# Patient Record
Sex: FEMALE | Race: WHITE | NOT HISPANIC OR LATINO | Employment: FULL TIME | URBAN - NONMETROPOLITAN AREA
[De-identification: names, ages, dates, MRNs, and addresses within clinical notes are randomized per-mention and may not be internally consistent; named-entity substitution may affect disease eponyms.]

---

## 2023-04-17 ENCOUNTER — OFFICE VISIT (OUTPATIENT)
Dept: PRIMARY CARE | Facility: CLINIC | Age: 58
End: 2023-04-17
Payer: COMMERCIAL

## 2023-04-17 VITALS
HEART RATE: 68 BPM | WEIGHT: 217 LBS | BODY MASS INDEX: 34.06 KG/M2 | TEMPERATURE: 98.9 F | OXYGEN SATURATION: 96 % | DIASTOLIC BLOOD PRESSURE: 80 MMHG | SYSTOLIC BLOOD PRESSURE: 133 MMHG | HEIGHT: 67 IN | RESPIRATION RATE: 12 BRPM

## 2023-04-17 DIAGNOSIS — G89.29 BILATERAL CHRONIC KNEE PAIN: Primary | ICD-10-CM

## 2023-04-17 DIAGNOSIS — M25.551 BILATERAL HIP PAIN: ICD-10-CM

## 2023-04-17 DIAGNOSIS — M25.561 BILATERAL CHRONIC KNEE PAIN: Primary | ICD-10-CM

## 2023-04-17 DIAGNOSIS — M25.552 BILATERAL HIP PAIN: ICD-10-CM

## 2023-04-17 DIAGNOSIS — M25.562 BILATERAL CHRONIC KNEE PAIN: Primary | ICD-10-CM

## 2023-04-17 PROBLEM — I35.1 AORTIC VALVE INSUFFICIENCY, ACQUIRED: Status: ACTIVE | Noted: 2023-04-17

## 2023-04-17 PROBLEM — J45.909 REACTIVE AIRWAY DISEASE (HHS-HCC): Status: ACTIVE | Noted: 2023-04-17

## 2023-04-17 PROBLEM — F41.8 SITUATIONAL ANXIETY: Status: ACTIVE | Noted: 2023-04-17

## 2023-04-17 PROBLEM — D12.6 SERRATED ADENOMA OF COLON: Status: ACTIVE | Noted: 2023-04-17

## 2023-04-17 PROBLEM — E55.9 VITAMIN D DEFICIENCY: Status: ACTIVE | Noted: 2023-04-17

## 2023-04-17 PROBLEM — M99.09 SEGMENTAL AND SOMATIC DYSFUNCTION: Status: ACTIVE | Noted: 2023-04-17

## 2023-04-17 PROBLEM — I10 HYPERTENSION: Status: ACTIVE | Noted: 2023-04-17

## 2023-04-17 PROBLEM — K21.9 GERD (GASTROESOPHAGEAL REFLUX DISEASE): Status: ACTIVE | Noted: 2023-04-17

## 2023-04-17 PROCEDURE — 99214 OFFICE O/P EST MOD 30 MIN: CPT | Performed by: FAMILY MEDICINE

## 2023-04-17 PROCEDURE — 3075F SYST BP GE 130 - 139MM HG: CPT | Performed by: FAMILY MEDICINE

## 2023-04-17 PROCEDURE — 3079F DIAST BP 80-89 MM HG: CPT | Performed by: FAMILY MEDICINE

## 2023-04-17 RX ORDER — CHOLECALCIFEROL (VITAMIN D3) 50 MCG
2000 TABLET ORAL DAILY
COMMUNITY

## 2023-04-17 RX ORDER — ESCITALOPRAM OXALATE 10 MG/1
10 TABLET ORAL DAILY
COMMUNITY
Start: 2017-03-30 | End: 2023-11-13 | Stop reason: DRUGHIGH

## 2023-04-17 RX ORDER — ALBUTEROL SULFATE 90 UG/1
2 AEROSOL, METERED RESPIRATORY (INHALATION) EVERY 4 HOURS PRN
COMMUNITY
Start: 2020-04-13 | End: 2024-03-22 | Stop reason: SDUPTHER

## 2023-04-17 RX ORDER — MELOXICAM 15 MG/1
15 TABLET ORAL DAILY
Qty: 30 TABLET | Refills: 0 | Status: SHIPPED | OUTPATIENT
Start: 2023-04-17 | End: 2023-10-19 | Stop reason: ALTCHOICE

## 2023-04-17 RX ORDER — HYDROCHLOROTHIAZIDE 12.5 MG/1
1 TABLET ORAL DAILY
COMMUNITY
Start: 2021-09-07 | End: 2023-12-26

## 2023-04-17 RX ORDER — PREDNISONE 20 MG/1
40 TABLET ORAL DAILY
Qty: 10 TABLET | Refills: 0 | Status: SHIPPED | OUTPATIENT
Start: 2023-04-17 | End: 2023-04-22

## 2023-04-17 NOTE — PROGRESS NOTES
Subjective   Patient ID: Susu Martin is a 58 y.o. female who presents for Knee Pain (Pain started getting worse in the last couple of months ) and Hip Pain (Pain is only at night ).    Has had knee pain in the past and has mild medial compartment arthritis on xray of 5/2022.     Both knees - usually one is worse that the other, but not always the same knee is the worst day to day. Pain is all around the knees. Worse than it was last year.     Hips are worse at night, not as soon as laying down, side sleeper and both hips will hurt. Hips started a while ago. A pillow between the legs can help, but still has pain. No prior hip xrays. Pain is primarily in the back, upper buttock muscles, a few inches below the SI joints    No prior injuries or surgeries.  No exercise routine currently.   Concerned about circulation, her father had vascular issues. No color change in the legs.    Legs feel heavy when walking the dog. No upper body pain.   No pain other than the knees.   Sit all day at work.     Uses voltaren topical occasionally for pain, helps some with pain.     HPI    Review of Systems    Objective   Physical Exam    Assessment/Plan   Problem List Items Addressed This Visit    None  Visit Diagnoses       Bilateral chronic knee pain    -  Primary    Relevant Medications    predniSONE (Deltasone) 20 mg tablet    meloxicam (Mobic) 15 mg tablet    Other Relevant Orders    Referral to Physical Therapy    Bilateral hip pain        Relevant Medications    predniSONE (Deltasone) 20 mg tablet    meloxicam (Mobic) 15 mg tablet    Other Relevant Orders    Referral to Physical Therapy

## 2023-04-17 NOTE — PATIENT INSTRUCTIONS
Take prednisone once daily with food, in the morning.    Once prednisone is completed, take mobic once daily for one month.    Start PT and do home exercises as instructed.

## 2023-05-15 ENCOUNTER — TELEPHONE (OUTPATIENT)
Dept: PRIMARY CARE | Facility: CLINIC | Age: 58
End: 2023-05-15

## 2023-05-15 DIAGNOSIS — G89.29 BILATERAL CHRONIC KNEE PAIN: Primary | ICD-10-CM

## 2023-05-15 DIAGNOSIS — M25.561 BILATERAL CHRONIC KNEE PAIN: Primary | ICD-10-CM

## 2023-05-15 DIAGNOSIS — M25.562 BILATERAL CHRONIC KNEE PAIN: Primary | ICD-10-CM

## 2023-05-15 NOTE — TELEPHONE ENCOUNTER
The patient was seen 4/17 with Dr. Moscoso. She states the left knee and buttock are better but her right knee is not. She is asking if she may proceed with ultrasound or MRI for the right knee.  The patient can be reached on her cell phone.

## 2023-05-16 NOTE — TELEPHONE ENCOUNTER
Called and spoke with patient. Patient was informed, understanding verbalized. Sent to Mitzy for assistance with scheduling

## 2023-05-16 NOTE — TELEPHONE ENCOUNTER
S/w pt and informed about injection and ortho and she would like to proceed with ortho referral for the knee injection

## 2023-05-16 NOTE — TELEPHONE ENCOUNTER
Based on the history an MRI is unlikely to help us. At this point she would likely benefit from a joint injection in the right knee and for further evaluation would recommend a referral to ortho for the injection and discussion of next steps. I can place referral order if she would like to take this step.    (5) normal, left/(5) normal, right

## 2023-09-07 ENCOUNTER — OFFICE VISIT (OUTPATIENT)
Dept: PRIMARY CARE | Facility: CLINIC | Age: 58
End: 2023-09-07
Payer: COMMERCIAL

## 2023-09-07 VITALS
SYSTOLIC BLOOD PRESSURE: 128 MMHG | TEMPERATURE: 98 F | WEIGHT: 217 LBS | OXYGEN SATURATION: 96 % | BODY MASS INDEX: 34.5 KG/M2 | RESPIRATION RATE: 16 BRPM | HEART RATE: 83 BPM | DIASTOLIC BLOOD PRESSURE: 81 MMHG

## 2023-09-07 DIAGNOSIS — L08.9 INFECTED CYST OF SKIN: Primary | ICD-10-CM

## 2023-09-07 DIAGNOSIS — L72.9 INFECTED CYST OF SKIN: Primary | ICD-10-CM

## 2023-09-07 DIAGNOSIS — L98.9 PAINFUL SKIN LESION: ICD-10-CM

## 2023-09-07 PROCEDURE — 3074F SYST BP LT 130 MM HG: CPT | Performed by: FAMILY MEDICINE

## 2023-09-07 PROCEDURE — 3079F DIAST BP 80-89 MM HG: CPT | Performed by: FAMILY MEDICINE

## 2023-09-07 PROCEDURE — 99213 OFFICE O/P EST LOW 20 MIN: CPT | Performed by: FAMILY MEDICINE

## 2023-09-07 RX ORDER — HYDROCODONE BITARTRATE AND ACETAMINOPHEN 5; 325 MG/1; MG/1
1 TABLET ORAL EVERY 8 HOURS PRN
Qty: 9 TABLET | Refills: 0 | Status: SHIPPED | OUTPATIENT
Start: 2023-09-07 | End: 2023-09-10

## 2023-09-07 RX ORDER — SULFAMETHOXAZOLE AND TRIMETHOPRIM 800; 160 MG/1; MG/1
1 TABLET ORAL 2 TIMES DAILY
Qty: 14 TABLET | Refills: 0 | Status: SHIPPED | OUTPATIENT
Start: 2023-09-07 | End: 2023-09-13 | Stop reason: SDUPTHER

## 2023-09-07 ASSESSMENT — PAIN SCALES - GENERAL: PAINLEVEL: 7

## 2023-09-07 NOTE — PROGRESS NOTES
Subjective   Patient ID: Susu Martin is a 58 y.o. female who presents for infected cyst .    [facility]   Has an appointment with  on 9/19 for removal. Had a cyst similar to this in a different location and had it removed.   She complains of pain, but no fever.  Has had a cyst x 1 year, reports infection since yesterday-redness, pain and swelling of cyst  Per chart, had epidermal cyst removed in past  Denies drainage at this time  Patient requests vicodin today, did not tolerate percocet in the past for pain    Review of Systems  See HPI    Objective   /81 (BP Location: Right arm, Patient Position: Sitting, BP Cuff Size: Large adult)   Pulse 83   Temp 36.7 °C (98 °F) (Temporal)   Resp 16   Wt 98.4 kg (217 lb)   SpO2 96%   BMI 34.50 kg/m²     Physical Exam  Constitutional: Well developed, well nourished, alert and in no acute distress   Eyes: Normal external exam.   Cardiovascular: No peripheral edema.  Pulmonary: No respiratory distress  Skin: Warm, well perfused, normal skin turgor and color. 3 x 1 cm in diameter erythematous, non-draining fluctuant papule with significant TTP located on left upper anterior chest.  Neurologic: Cranial nerves II-XII grossly intact.   Psychiatric: Mood calm and affect normal.    Assessment/Plan   May take Tylenol 1000mg with Ibuprofen 800mg together every 8 hours as needed for pain.     I personally have reviewed the OARRS report for this patient.  This report is scanned into the electronic medical record.  I have considered the risks of abuse, dependence, addiction and diversion.  I believe that it is clinically appropriate for the patient to be prescribed this medication.    May take Vicodin, as requested, for uncontrolled pain only as needed. Do not drive after taking this medication as it may cause drowsiness. It may also cause constipation.     START Bactrim antibiotic with food as directed. This medication can place you at risk for stomach upset, rash and  C.diff. We discussed benefits, risks and potential adverse effects.     Keep appointment with  on 9/19    If your symptoms worsen, please contact me immediately as we discussed we might need to incise and drain the cyst in the office.

## 2023-09-13 ENCOUNTER — TELEPHONE (OUTPATIENT)
Dept: PRIMARY CARE | Facility: CLINIC | Age: 58
End: 2023-09-13
Payer: COMMERCIAL

## 2023-09-13 DIAGNOSIS — L72.9 INFECTED CYST OF SKIN: ICD-10-CM

## 2023-09-13 DIAGNOSIS — L08.9 INFECTED CYST OF SKIN: ICD-10-CM

## 2023-09-13 RX ORDER — SULFAMETHOXAZOLE AND TRIMETHOPRIM 800; 160 MG/1; MG/1
1 TABLET ORAL 2 TIMES DAILY
Qty: 14 TABLET | Refills: 0 | Status: SHIPPED | OUTPATIENT
Start: 2023-09-13 | End: 2023-09-20

## 2023-09-13 NOTE — TELEPHONE ENCOUNTER
Pt called to see if she could get a refill on her Bactrim. Pt was seen on 9/07/23 for a cyst. Pt states it's still infected. She was wondering if you could just refill or if you need to see her. She does have an apt with Dr. Branch on 9/19/23 for removal. Pt uses [facility] in [facility]. Pt is requesting a cb at [phone].

## 2023-09-29 PROBLEM — L08.9 INFECTED CYST OF SKIN: Status: ACTIVE | Noted: 2023-09-29

## 2023-09-29 PROBLEM — L72.9 INFECTED CYST OF SKIN: Status: ACTIVE | Noted: 2023-09-29

## 2023-09-29 PROBLEM — E66.9 CLASS 1 OBESITY WITH BODY MASS INDEX (BMI) OF 32.0 TO 32.9 IN ADULT: Status: ACTIVE | Noted: 2020-08-31

## 2023-09-29 PROBLEM — R07.9 CHEST PAIN: Status: ACTIVE | Noted: 2023-09-29

## 2023-09-29 PROBLEM — M94.0 COSTOCHONDRITIS: Status: ACTIVE | Noted: 2023-09-29

## 2023-09-29 PROBLEM — R31.21 ASYMPTOMATIC MICROSCOPIC HEMATURIA: Status: ACTIVE | Noted: 2023-09-29

## 2023-09-29 PROBLEM — F43.9 STRESS: Status: ACTIVE | Noted: 2023-09-29

## 2023-09-29 PROBLEM — E66.811 CLASS 1 OBESITY WITH BODY MASS INDEX (BMI) OF 32.0 TO 32.9 IN ADULT: Status: ACTIVE | Noted: 2020-08-31

## 2023-09-29 PROBLEM — M79.18 LEVATOR SCAPULA SYNDROME: Status: ACTIVE | Noted: 2023-09-29

## 2023-09-29 RX ORDER — CYCLOBENZAPRINE HCL 10 MG
1 TABLET ORAL NIGHTLY PRN
COMMUNITY
Start: 2023-01-03 | End: 2023-10-19 | Stop reason: WASHOUT

## 2023-09-29 RX ORDER — MUPIROCIN CALCIUM 20 MG/G
CREAM TOPICAL 3 TIMES DAILY
COMMUNITY
Start: 2023-09-19 | End: 2023-11-13 | Stop reason: ALTCHOICE

## 2023-09-29 RX ORDER — HYDROCODONE BITARTRATE AND ACETAMINOPHEN 5; 325 MG/1; MG/1
1 TABLET ORAL EVERY 4 HOURS PRN
COMMUNITY
Start: 2023-09-19 | End: 2023-10-03 | Stop reason: SDUPTHER

## 2023-10-03 ENCOUNTER — OFFICE VISIT (OUTPATIENT)
Dept: SURGERY | Facility: CLINIC | Age: 58
End: 2023-10-03
Payer: COMMERCIAL

## 2023-10-03 ENCOUNTER — PREP FOR PROCEDURE (OUTPATIENT)
Dept: SURGERY | Facility: HOSPITAL | Age: 58
End: 2023-10-03

## 2023-10-03 VITALS — BODY MASS INDEX: 34.02 KG/M2 | SYSTOLIC BLOOD PRESSURE: 142 MMHG | WEIGHT: 214 LBS | DIASTOLIC BLOOD PRESSURE: 82 MMHG

## 2023-10-03 DIAGNOSIS — L72.0 EPIDERMAL INCLUSION CYST: Primary | ICD-10-CM

## 2023-10-03 PROCEDURE — 99213 OFFICE O/P EST LOW 20 MIN: CPT | Performed by: SURGERY

## 2023-10-03 PROCEDURE — 3079F DIAST BP 80-89 MM HG: CPT | Performed by: SURGERY

## 2023-10-03 PROCEDURE — 3077F SYST BP >= 140 MM HG: CPT | Performed by: SURGERY

## 2023-10-03 RX ORDER — DIAZEPAM 10 MG/1
10 TABLET ORAL ONCE
Status: DISCONTINUED | OUTPATIENT
Start: 2023-10-03 | End: 2023-11-13 | Stop reason: ALTCHOICE

## 2023-10-03 RX ORDER — MUPIROCIN 20 MG/G
1 OINTMENT TOPICAL
COMMUNITY
Start: 2023-09-19 | End: 2023-11-13 | Stop reason: ALTCHOICE

## 2023-10-03 RX ORDER — HYDROCODONE BITARTRATE AND ACETAMINOPHEN 5; 325 MG/1; MG/1
1 TABLET ORAL EVERY 4 HOURS PRN
Qty: 12 TABLET | Refills: 0 | Status: SHIPPED | OUTPATIENT
Start: 2023-10-03 | End: 2023-11-13 | Stop reason: ALTCHOICE

## 2023-10-03 NOTE — H&P (VIEW-ONLY)
History Of Present Illness  Susu Martin is a 58 y.o. female presenting for short-term follow-up.  The patient was seen by me on 9/19/2023 for an infected epidermal inclusion cyst of the left anterior shoulder.  Please see the [employer] note dated 9/19/2023 for details.  At that time, the patient was instructed to finish her current antibiotics, and apply Bactroban 2% 3 times daily along with warm compresses.  I did prescribe hydrocodone for breakthrough pain.  On examination at that time, the patient had a 2-1/2 x 1/2 cm area of area of erythema and induration with a central 2 x 1 cm ulceration with a central sinus tract.  Cystic contents could be expressed.    The patient notes that with antibiotics both oral and topical, and the warm compresses, the inflammation has significantly diminished and nearly completely resolved.  She has had no further drainage.  She denies fever chills or sweats.    Of note, the patient has had 2 previous excision of epidermal inclusion cyst, one of the left posterior neck on 6/23/2017 and one of the anterior chest on 11/15/2019.    The patient lives in [place].  She is single.  She works in property management.     Past Medical History  She has a past medical history of Abdominal distension (gaseous) (05/18/2022), Acute bronchitis, unspecified (11/07/2013), Anxiety disorder, unspecified (04/11/2014), Cervicalgia (11/07/2013), Cough, unspecified (03/17/2014), Cough, unspecified (04/07/2017), Dysuria (03/06/2017), Encounter for general adult medical examination without abnormal findings (11/07/2013), Nicotine dependence, unspecified, uncomplicated (06/02/2014), Other chest pain (04/07/2017), Other conditions influencing health status (11/07/2013), Other specified soft tissue disorders (11/07/2013), Pain in left foot (03/06/2017), Pain in left forearm (03/06/2017), Pain in right knee (03/06/2017), Personal history of other drug therapy (03/06/2017), Stress fracture, unspecified  foot, initial encounter for fracture (03/06/2017), and Unspecified fracture of unspecified foot, initial encounter for closed fracture (11/07/2013).    Surgical History  She has no past surgical history on file.     Social History  She reports that she has been smoking cigarettes. She has been exposed to tobacco smoke. She has never used smokeless tobacco. She reports current alcohol use of about 4.0 standard drinks of alcohol per week. She reports that she does not use drugs.    Family History  Family History   Problem Relation Name Age of Onset    Hypertension Mother      Other (localized cancer of appendix) Mother      Hypertension Father      Lung cancer Sister      Breast cancer Sister          Allergies  Patient has no known allergies.    Review of Systems  Review of Systems    General: Not Present- Obesity, Cancer, HIV, MRSA, Recent Cold/Flu, Tired During the Day and VRE.  HEENT: Not Present- Migraine, Cataracts, Glaucoma, Macular Degeneration and Retinal Detachment.  Respiratory: Not Present- Asthma, Chronic Cough, Difficulty Breathing on Exertion, Difficulty Breathing at Rest, Emphysema, Frequent Bronchitis, Home CPAP/BiPAP, Home Oxygen, Pulmonary Embolus, Pneumonia/TB, Sleep Apnea and Snoring.  Cardiovascular: Not Present- Chest Pain, Congestive Heart Failure, Heart Attack, Coronary Artery Disease, Heart Stent, High Cholesterol/Lipids, Hypertension, Internal Defibrillator, Irregular Heart Beat, Mitral Valve Prolapse, Murmur, Pacemaker and Peripheral Vascular Disease.  Gastrointestinal: Not Present- Heartburn, Hepatitis, Hiatal Hernia, Jaundice, Reflux, Stomach Ulcer and IBS.  Female Genitourinary: Not Present- Kidney Failure, Kidney Stones, Dialysis and Urinary Tract Infection.  Musculoskeletal: Not Present- Arthritis, Back Pain and Fibromyalgia.  Neurological: Not Present- Headaches, Numbness, Tingling, Seizures, Stroke,  Shunt and Weakness.  Psychiatric: Not Present- Anxiety, Bipolar, Depression and  Panic Attacks.  Endocrine: Not Present- Diabetes, Hyperthyroidism, Hypothyroidism and Low Blood Sugar.  Hematology: Not Present- Abnormal Bleeding, Anemia and Blood Clots.    Physical Exam     Skin & Soft Tissue Exam    Integumentary  Global Assessment: Examination of related systems reveals - Well-developed, well-nourished and in no acute distress; alert and oriented x 3,  Neck supple, with no palpable masses, no thyromegaly, No lymphadenopathy and Apocrine and  eccrine glands are normal with no hyperhidrosis.   Upon inspection and palpation of skin surfaces of the - Head/Face: no rashes, ulcers, lesions or evidence of photo damage. No palpable nodules or masses, Neck: no visible lesions or palpable masses, Chest: no swelling,scarring or lesions. No prominent arteries or veins observed, Axillae: non-tender, no inflammation or ulceration, no drainage., Abdomen: no scars, rashes or lesions, Back: normal skin surface, no evidence of  photo damage, no suspicious lesions, Right upper extremity: no lesions or rashes. No evidence of photo damage, Left upper extremity: no lesions or rashes. No evidence of photo damage, Right lower extremity: no stasis ulcers, rashes or suspicious lesions. No evidence of photo damage, Left lower extremity: no stasis ulcers, rashes or suspicious lesions. No evidence of  photo damage, Distribution of scalp and body hair is normal and No dandruff or other scalp lesions noted.    Left anterior shoulder: There is a residual 2 cm sagittal by 1.5 cm transverse area of erythema with a very tiny central scab; no tenderness; there is a palpable cyst along the superior aspect of this area of erythema; significantly improved    Chest and Lung Exam  Chest and lung exam reveals - normal excursion with symmetric chest walls and on auscultation, normal breath sounds, no  adventitious sounds and normal vocal resonance.    Cardiovascular  Cardiovascular examination reveals - normal heart sounds, regular rate  and rhythm with no murmurs.  Last Recorded Vitals  Blood pressure 142/82, weight 97.1 kg (214 lb).    Relevant Results         Assessment/Plan      Susu as in the epidermal inclusion cyst of the left anterior shoulder.  This was infected.  The infection has nearly completely resolved.    Recommendation:    In order to relieve symptoms and further complications, excision of this lesion is indicated and recommended.    We will proceed with this in the near future at [facility] under local anesthesia as an outpatient on Friday, 10/20/2023.    1 hour prior to the procedure, the patient will take a 10 mg tablet of Valium for anxiety.  She will have a .  She will also take 2 extra strength Tylenol at that time.  I have prescribed Norco/hydrocodone for postoperative pain in addition to Tylenol and ibuprofen.    Patient will see me for a postoperative visit at my [facility] office on Tuesday, 10/31/2023.    Skin and Soft Tissue Consent: The procedure was explained to the patient in detail, including the risks, benefits and alternatives. The risks include, but not limited to, infection, bleeding, hematoma, seroma, nerve injury resulting in motor or sensory deficit/disability, recurrence, prolonged wound healing and the need for further surgery. The patient agreed and signed consent.      Mingo Branch MD

## 2023-10-03 NOTE — H&P
History Of Present Illness  Susu Martin is a 58 y.o. female presenting for short-term follow-up.  The patient was seen by me on 9/19/2023 for an infected epidermal inclusion cyst of the left anterior shoulder.  Please see the [employer] note dated 9/19/2023 for details.  At that time, the patient was instructed to finish her current antibiotics, and apply Bactroban 2% 3 times daily along with warm compresses.  I did prescribe hydrocodone for breakthrough pain.  On examination at that time, the patient had a 2-1/2 x 1/2 cm area of area of erythema and induration with a central 2 x 1 cm ulceration with a central sinus tract.  Cystic contents could be expressed.    The patient notes that with antibiotics both oral and topical, and the warm compresses, the inflammation has significantly diminished and nearly completely resolved.  She has had no further drainage.  She denies fever chills or sweats.    Of note, the patient has had 2 previous excision of epidermal inclusion cyst, one of the left posterior neck on 6/23/2017 and one of the anterior chest on 11/15/2019.    The patient lives in [place].  She is single.  She works in property management.     Past Medical History  She has a past medical history of Abdominal distension (gaseous) (05/18/2022), Acute bronchitis, unspecified (11/07/2013), Anxiety disorder, unspecified (04/11/2014), Cervicalgia (11/07/2013), Cough, unspecified (03/17/2014), Cough, unspecified (04/07/2017), Dysuria (03/06/2017), Encounter for general adult medical examination without abnormal findings (11/07/2013), Nicotine dependence, unspecified, uncomplicated (06/02/2014), Other chest pain (04/07/2017), Other conditions influencing health status (11/07/2013), Other specified soft tissue disorders (11/07/2013), Pain in left foot (03/06/2017), Pain in left forearm (03/06/2017), Pain in right knee (03/06/2017), Personal history of other drug therapy (03/06/2017), Stress fracture, unspecified  foot, initial encounter for fracture (03/06/2017), and Unspecified fracture of unspecified foot, initial encounter for closed fracture (11/07/2013).    Surgical History  She has no past surgical history on file.     Social History  She reports that she has been smoking cigarettes. She has been exposed to tobacco smoke. She has never used smokeless tobacco. She reports current alcohol use of about 4.0 standard drinks of alcohol per week. She reports that she does not use drugs.    Family History  Family History   Problem Relation Name Age of Onset    Hypertension Mother      Other (localized cancer of appendix) Mother      Hypertension Father      Lung cancer Sister      Breast cancer Sister          Allergies  Patient has no known allergies.    Review of Systems  Review of Systems    General: Not Present- Obesity, Cancer, HIV, MRSA, Recent Cold/Flu, Tired During the Day and VRE.  HEENT: Not Present- Migraine, Cataracts, Glaucoma, Macular Degeneration and Retinal Detachment.  Respiratory: Not Present- Asthma, Chronic Cough, Difficulty Breathing on Exertion, Difficulty Breathing at Rest, Emphysema, Frequent Bronchitis, Home CPAP/BiPAP, Home Oxygen, Pulmonary Embolus, Pneumonia/TB, Sleep Apnea and Snoring.  Cardiovascular: Not Present- Chest Pain, Congestive Heart Failure, Heart Attack, Coronary Artery Disease, Heart Stent, High Cholesterol/Lipids, Hypertension, Internal Defibrillator, Irregular Heart Beat, Mitral Valve Prolapse, Murmur, Pacemaker and Peripheral Vascular Disease.  Gastrointestinal: Not Present- Heartburn, Hepatitis, Hiatal Hernia, Jaundice, Reflux, Stomach Ulcer and IBS.  Female Genitourinary: Not Present- Kidney Failure, Kidney Stones, Dialysis and Urinary Tract Infection.  Musculoskeletal: Not Present- Arthritis, Back Pain and Fibromyalgia.  Neurological: Not Present- Headaches, Numbness, Tingling, Seizures, Stroke,  Shunt and Weakness.  Psychiatric: Not Present- Anxiety, Bipolar, Depression and  Panic Attacks.  Endocrine: Not Present- Diabetes, Hyperthyroidism, Hypothyroidism and Low Blood Sugar.  Hematology: Not Present- Abnormal Bleeding, Anemia and Blood Clots.    Physical Exam     Skin & Soft Tissue Exam    Integumentary  Global Assessment: Examination of related systems reveals - Well-developed, well-nourished and in no acute distress; alert and oriented x 3,  Neck supple, with no palpable masses, no thyromegaly, No lymphadenopathy and Apocrine and  eccrine glands are normal with no hyperhidrosis.   Upon inspection and palpation of skin surfaces of the - Head/Face: no rashes, ulcers, lesions or evidence of photo damage. No palpable nodules or masses, Neck: no visible lesions or palpable masses, Chest: no swelling,scarring or lesions. No prominent arteries or veins observed, Axillae: non-tender, no inflammation or ulceration, no drainage., Abdomen: no scars, rashes or lesions, Back: normal skin surface, no evidence of  photo damage, no suspicious lesions, Right upper extremity: no lesions or rashes. No evidence of photo damage, Left upper extremity: no lesions or rashes. No evidence of photo damage, Right lower extremity: no stasis ulcers, rashes or suspicious lesions. No evidence of photo damage, Left lower extremity: no stasis ulcers, rashes or suspicious lesions. No evidence of  photo damage, Distribution of scalp and body hair is normal and No dandruff or other scalp lesions noted.    Left anterior shoulder: There is a residual 2 cm sagittal by 1.5 cm transverse area of erythema with a very tiny central scab; no tenderness; there is a palpable cyst along the superior aspect of this area of erythema; significantly improved    Chest and Lung Exam  Chest and lung exam reveals - normal excursion with symmetric chest walls and on auscultation, normal breath sounds, no  adventitious sounds and normal vocal resonance.    Cardiovascular  Cardiovascular examination reveals - normal heart sounds, regular rate  and rhythm with no murmurs.  Last Recorded Vitals  Blood pressure 142/82, weight 97.1 kg (214 lb).    Relevant Results         Assessment/Plan      Susu as in the epidermal inclusion cyst of the left anterior shoulder.  This was infected.  The infection has nearly completely resolved.    Recommendation:    In order to relieve symptoms and further complications, excision of this lesion is indicated and recommended.    We will proceed with this in the near future at [facility] under local anesthesia as an outpatient on Friday, 10/20/2023.    1 hour prior to the procedure, the patient will take a 10 mg tablet of Valium for anxiety.  She will have a .  She will also take 2 extra strength Tylenol at that time.  I have prescribed Norco/hydrocodone for postoperative pain in addition to Tylenol and ibuprofen.    Patient will see me for a postoperative visit at my [facility] office on Tuesday, 10/31/2023.    Skin and Soft Tissue Consent: The procedure was explained to the patient in detail, including the risks, benefits and alternatives. The risks include, but not limited to, infection, bleeding, hematoma, seroma, nerve injury resulting in motor or sensory deficit/disability, recurrence, prolonged wound healing and the need for further surgery. The patient agreed and signed consent.      Mingo Branch MD     no

## 2023-10-17 ENCOUNTER — TELEPHONE (OUTPATIENT)
Dept: GASTROENTEROLOGY | Facility: CLINIC | Age: 58
End: 2023-10-17
Payer: COMMERCIAL

## 2023-10-17 NOTE — TELEPHONE ENCOUNTER
I called pt, left her a vm message to call back. I need more info as to what is going on w/medication.

## 2023-10-17 NOTE — TELEPHONE ENCOUNTER
Susu called stating that her pain medication that was called into the Pharmacy was not able to be filled, hydrocodone-acetaminophen. She also would like to see if her cyst removal for 10/20 with Dr. Branch could be possibly scheduled for a morning time.

## 2023-10-18 DIAGNOSIS — F41.9 ANXIETY: Primary | ICD-10-CM

## 2023-10-18 RX ORDER — DIAZEPAM 10 MG/1
10 TABLET ORAL ONCE
Qty: 1 TABLET | Refills: 0 | Status: SHIPPED | OUTPATIENT
Start: 2023-10-18 | End: 2023-11-13 | Stop reason: ALTCHOICE

## 2023-10-18 NOTE — TELEPHONE ENCOUNTER
Dr. Branch,  I spoke w/pt. The pharmacy had the hydrocodone, but not the Valium.  Can you please re-send  to her pharmacy? Thanks in advance.

## 2023-10-18 NOTE — TELEPHONE ENCOUNTER
Pt phones in. Asking for valium. States Dr. Branch was going to call this and hydrocodone in for her? Please advise.

## 2023-10-18 NOTE — TELEPHONE ENCOUNTER
Pt wants to know if Dr. Branch is calling her in an rx for Tamy;ium and hydrocodone, I sent him a message and will advise pt when I hear back.

## 2023-10-20 ENCOUNTER — HOSPITAL ENCOUNTER (OUTPATIENT)
Facility: HOSPITAL | Age: 58
Setting detail: OUTPATIENT SURGERY
Discharge: HOME | End: 2023-10-20
Attending: SURGERY | Admitting: SURGERY
Payer: COMMERCIAL

## 2023-10-20 VITALS
OXYGEN SATURATION: 99 % | RESPIRATION RATE: 16 BRPM | DIASTOLIC BLOOD PRESSURE: 74 MMHG | SYSTOLIC BLOOD PRESSURE: 163 MMHG | TEMPERATURE: 97.5 F | HEART RATE: 71 BPM

## 2023-10-20 DIAGNOSIS — L72.9 INFECTED CYST OF SKIN: Primary | ICD-10-CM

## 2023-10-20 DIAGNOSIS — L08.9 INFECTED CYST OF SKIN: Primary | ICD-10-CM

## 2023-10-20 DIAGNOSIS — L72.0 EPIDERMAL INCLUSION CYST: ICD-10-CM

## 2023-10-20 PROCEDURE — 88304 TISSUE EXAM BY PATHOLOGIST: CPT | Mod: TC,PARLAB | Performed by: SURGERY

## 2023-10-20 PROCEDURE — 2720000007 HC OR 272 NO HCPCS: Performed by: SURGERY

## 2023-10-20 PROCEDURE — 7100000009 HC PHASE TWO TIME - INITIAL BASE CHARGE: Performed by: SURGERY

## 2023-10-20 PROCEDURE — 3600000003 HC OR TIME - INITIAL BASE CHARGE - PROCEDURE LEVEL THREE: Performed by: SURGERY

## 2023-10-20 PROCEDURE — 12032 INTMD RPR S/A/T/EXT 2.6-7.5: CPT | Performed by: SURGERY

## 2023-10-20 PROCEDURE — 88304 TISSUE EXAM BY PATHOLOGIST: CPT | Mod: TC,SUR,PARLAB | Performed by: SURGERY

## 2023-10-20 PROCEDURE — 2500000004 HC RX 250 GENERAL PHARMACY W/ HCPCS (ALT 636 FOR OP/ED): Performed by: SURGERY

## 2023-10-20 PROCEDURE — 88304 TISSUE EXAM BY PATHOLOGIST: CPT | Performed by: PATHOLOGY

## 2023-10-20 PROCEDURE — 7100000010 HC PHASE TWO TIME - EACH INCREMENTAL 1 MINUTE: Performed by: SURGERY

## 2023-10-20 PROCEDURE — 3600000008 HC OR TIME - EACH INCREMENTAL 1 MINUTE - PROCEDURE LEVEL THREE: Performed by: SURGERY

## 2023-10-20 PROCEDURE — 11403 EXC TR-EXT B9+MARG 2.1-3CM: CPT | Performed by: SURGERY

## 2023-10-20 PROCEDURE — 0752T DGTZ GLS MCRSCP SLD LVL III: CPT | Mod: TC,SUR,PARLAB | Performed by: SURGERY

## 2023-10-20 PROCEDURE — 0752T DGTZ GLS MCRSCP SLD LVL III: CPT | Mod: TC,PARLAB | Performed by: SURGERY

## 2023-10-20 PROCEDURE — 2500000005 HC RX 250 GENERAL PHARMACY W/O HCPCS: Performed by: SURGERY

## 2023-10-20 RX ORDER — HYDROCODONE BITARTRATE AND ACETAMINOPHEN 5; 325 MG/1; MG/1
1 TABLET ORAL EVERY 6 HOURS PRN
Qty: 12 TABLET | Refills: 0 | Status: SHIPPED | OUTPATIENT
Start: 2023-10-20 | End: 2023-10-27

## 2023-10-20 RX ORDER — LIDOCAINE HYDROCHLORIDE AND EPINEPHRINE 5; 5 MG/ML; UG/ML
INJECTION, SOLUTION INFILTRATION; PERINEURAL AS NEEDED
Status: DISCONTINUED | OUTPATIENT
Start: 2023-10-20 | End: 2023-10-20 | Stop reason: HOSPADM

## 2023-10-20 RX ORDER — BUPIVACAINE HYDROCHLORIDE 5 MG/ML
INJECTION, SOLUTION EPIDURAL; INTRACAUDAL AS NEEDED
Status: DISCONTINUED | OUTPATIENT
Start: 2023-10-20 | End: 2023-10-20 | Stop reason: HOSPADM

## 2023-10-20 ASSESSMENT — COLUMBIA-SUICIDE SEVERITY RATING SCALE - C-SSRS
6. HAVE YOU EVER DONE ANYTHING, STARTED TO DO ANYTHING, OR PREPARED TO DO ANYTHING TO END YOUR LIFE?: NO
1. IN THE PAST MONTH, HAVE YOU WISHED YOU WERE DEAD OR WISHED YOU COULD GO TO SLEEP AND NOT WAKE UP?: NO
2. HAVE YOU ACTUALLY HAD ANY THOUGHTS OF KILLING YOURSELF?: NO

## 2023-10-20 ASSESSMENT — PAIN - FUNCTIONAL ASSESSMENT
PAIN_FUNCTIONAL_ASSESSMENT: 0-10
PAIN_FUNCTIONAL_ASSESSMENT: 0-10

## 2023-10-20 ASSESSMENT — PAIN SCALES - GENERAL
PAINLEVEL_OUTOF10: 0 - NO PAIN
PAINLEVEL_OUTOF10: 0 - NO PAIN

## 2023-10-20 NOTE — OP NOTE
LEFT ANTERIOR SHOULDER EPIDURAL INCLUSION CYST EXCISION (L), N/A Operative Note     Date: 10/20/2023  OR Location: [facility]    Name: Susu Martin, : 1965, Age: 58 y.o., MRN: [id number], Sex: female    Diagnosis  Pre-op Diagnosis     * Epidermal inclusion cyst [L72.0] Post-op Diagnosis     * Epidermal inclusion cyst [L72.0]     Procedures  Excision of epidermal inclusion cyst, left anterior shoulder, with layered/intermediate closure    Surgeons      * [place] Branch - Primary    Resident/Fellow/Other Assistant:  Surgeon(s) and Role: Eh Payton S.A.;  Indio Altamirano    Procedure Summary  Anesthesia: Local  ASA: ASA status not filed in the log.  Anesthesia Staff: No anesthesia staff entered.  Estimated Blood Loss: Less than 2 mL  Intra-op Medications: * No intraprocedure medications in log *      Intraprocedure I/O Totals       None           Specimen: No specimens collected     Staff:   Circulator: Maeve Barker RN         Drains and/or Catheters: * None in log *    Tourniquet Times:         Implants:     Findings: The patient had a roughly 3 x 1 cm residual epidermal inclusion cyst of the left anterior shoulder with no evidence of infection following treatment with antibiotics.    Indications: Susu Martin is an 58 y.o. female who is having surgery for Epidermal inclusion cyst [L72.0].     The patient was seen in the preoperative area. The risks, benefits, complications, treatment options, non-operative alternatives, expected recovery and outcomes were discussed with the patient. The possibilities of reaction to medication, pulmonary aspiration, injury to surrounding structures, bleeding, recurrent infection, the need for additional procedures, failure to diagnose a condition, and creating a complication requiring transfusion or operation were discussed with the patient. The patient concurred with the proposed plan, giving informed consent.  The site of surgery was properly noted/marked if  necessary per policy. The patient has been actively warmed in preoperative area. Preoperative antibiotics are not indicated. Venous thrombosis prophylaxis are not indicated.    Procedure Details:   Findings:   Epidermal inclusion cyst -left anterior shoulder -sagittally oriented 3 x 1 send uterus    Specimen:  Epidermal inclusion cyst -left anterior shoulder    Procedure:      The patient was taken to the operating room placed on the table in the supine position.  The left arm was placed at 90 degrees on an armboard.  The left anterior shoulder was prepared and draped in normal sterile fashion. The surgical site was marked by the surgeon preoperatively. After the appropriate timeout, the case commenced.    Around the mass, a sagittal elliptical incision was marked, incorporating the sinus tract.  The skin and subcutaneous tissues were anesthetized using the above-noted local infiltrative anesthesia. The skin was divided down to the cyst wall. The cyst was then circumferentially and sharply dissected free, removed in toto with the overlying skin,  and sent to pathology as a specimen.  Hemostasis was obtained in the wound cavity using minimal electrocautery. The wound was irrigated and dried. The deep dermis was then approximated using interrupted 3-0 Vicryl sutures. The skin was approximated using a running 4-0 undyed subcuticular Vicryl suture. Two additional 4-0 simple Monocryl sutures were placed.  The wound was cleaned and dried, benzoin and Steri-Strips were placed as an occlusive dressing.      The patient tolerated the procedure well. Sponge, needle, and instrument counts were correct times 2.  EBL was < 2 cc.  The patient was taken to the post-operative holding area with stable vital signs and in excellent condition.    M[place] Branch M.D.  Complications:  None; patient tolerated the procedure well.    Disposition: PACU - hemodynamically stable.  Condition: stable         Additional Details:  none`    Attending Attestation: I was present and scrubbed for the entire procedure.    Mingo Branch  Phone Number: [phone]

## 2023-10-23 ASSESSMENT — PAIN SCALES - GENERAL: PAINLEVEL_OUTOF10: 0 - NO PAIN

## 2023-10-29 NOTE — PROGRESS NOTES
Post-Operative Office Visit  Susu Martin presents for her postoperative visit.  On 10/20/2023 the patient underwent excision of an epidermal inclusion cyst of the left anterior shoulder.  Please see the operative note for details.  Pathology is pending.    The patient's postoperative course has been unremarkable.  She did use all 12 Vicodin.  No issues with the incision.    Vitals  There were no vitals taken for this visit.     Exam    Post Op General Physical Exam    The physical exam findings are as follows:    Integumentary    Incision -3 cm sagittal incision, left anterior shoulder  - dry, Intact, No evidence of infection, hematoma, or seroma, edges well-approximated.  No drainage present, no ecchymosis, no redness and no warmth to the touch.  Sutures and Steri-Strips removed.  Minimal erythema.  No evidence of infection.        Assessment and Plan    Susu has done very well postoperatively.    Recommendations:    May massage moisturizing cream or ointment or lotion along incision daily for 1 to 2 weeks to help soften    No restrictions    We will call with pathology results    Follow-up as needed    Addendum: The pathology resulted on 11/1/2023.  This confirmed epidermal inclusion cyst with foreign body giant cell reaction and chronic inflammation.  I called the patient this morning and relayed these expected benign results to her.  She will call or follow-up as needed.

## 2023-10-31 ENCOUNTER — OFFICE VISIT (OUTPATIENT)
Dept: SURGERY | Facility: CLINIC | Age: 58
End: 2023-10-31
Payer: COMMERCIAL

## 2023-10-31 VITALS
BODY MASS INDEX: 34.55 KG/M2 | HEART RATE: 78 BPM | SYSTOLIC BLOOD PRESSURE: 121 MMHG | OXYGEN SATURATION: 96 % | HEIGHT: 66 IN | WEIGHT: 215 LBS | DIASTOLIC BLOOD PRESSURE: 72 MMHG

## 2023-10-31 DIAGNOSIS — L72.0 EPIDERMAL INCLUSION CYST: Primary | ICD-10-CM

## 2023-10-31 PROCEDURE — 99024 POSTOP FOLLOW-UP VISIT: CPT | Performed by: SURGERY

## 2023-10-31 PROCEDURE — 3074F SYST BP LT 130 MM HG: CPT | Performed by: SURGERY

## 2023-10-31 PROCEDURE — 3078F DIAST BP <80 MM HG: CPT | Performed by: SURGERY

## 2023-10-31 ASSESSMENT — PAIN SCALES - GENERAL: PAINLEVEL: 0-NO PAIN

## 2023-11-01 LAB
LABORATORY COMMENT REPORT: NORMAL
PATH REPORT.FINAL DX SPEC: NORMAL
PATH REPORT.GROSS SPEC: NORMAL
PATH REPORT.RELEVANT HX SPEC: NORMAL
PATH REPORT.TOTAL CANCER: NORMAL

## 2023-11-13 ENCOUNTER — LAB (OUTPATIENT)
Dept: LAB | Facility: LAB | Age: 58
End: 2023-11-13
Payer: COMMERCIAL

## 2023-11-13 ENCOUNTER — OFFICE VISIT (OUTPATIENT)
Dept: PRIMARY CARE | Facility: CLINIC | Age: 58
End: 2023-11-13
Payer: COMMERCIAL

## 2023-11-13 VITALS
TEMPERATURE: 98.4 F | HEART RATE: 68 BPM | SYSTOLIC BLOOD PRESSURE: 129 MMHG | BODY MASS INDEX: 34.89 KG/M2 | OXYGEN SATURATION: 97 % | RESPIRATION RATE: 16 BRPM | WEIGHT: 217.1 LBS | DIASTOLIC BLOOD PRESSURE: 74 MMHG | HEIGHT: 66 IN

## 2023-11-13 DIAGNOSIS — I10 PRIMARY HYPERTENSION: ICD-10-CM

## 2023-11-13 DIAGNOSIS — Z00.00 HEALTH CARE MAINTENANCE: ICD-10-CM

## 2023-11-13 DIAGNOSIS — I10 PRIMARY HYPERTENSION: Primary | ICD-10-CM

## 2023-11-13 DIAGNOSIS — E55.9 VITAMIN D DEFICIENCY: ICD-10-CM

## 2023-11-13 PROCEDURE — 3074F SYST BP LT 130 MM HG: CPT | Performed by: FAMILY MEDICINE

## 2023-11-13 PROCEDURE — 80061 LIPID PANEL: CPT

## 2023-11-13 PROCEDURE — 83735 ASSAY OF MAGNESIUM: CPT

## 2023-11-13 PROCEDURE — 36415 COLL VENOUS BLD VENIPUNCTURE: CPT

## 2023-11-13 PROCEDURE — 81001 URINALYSIS AUTO W/SCOPE: CPT

## 2023-11-13 PROCEDURE — 84443 ASSAY THYROID STIM HORMONE: CPT

## 2023-11-13 PROCEDURE — 82306 VITAMIN D 25 HYDROXY: CPT

## 2023-11-13 PROCEDURE — 3078F DIAST BP <80 MM HG: CPT | Performed by: FAMILY MEDICINE

## 2023-11-13 PROCEDURE — 80053 COMPREHEN METABOLIC PANEL: CPT

## 2023-11-13 PROCEDURE — 85025 COMPLETE CBC W/AUTO DIFF WBC: CPT

## 2023-11-13 PROCEDURE — 99396 PREV VISIT EST AGE 40-64: CPT | Performed by: FAMILY MEDICINE

## 2023-11-13 RX ORDER — ESCITALOPRAM OXALATE 10 MG/1
10 TABLET ORAL DAILY
Qty: 90 TABLET | Refills: 3 | Status: SHIPPED | OUTPATIENT
Start: 2023-11-13 | End: 2023-11-16 | Stop reason: SDUPTHER

## 2023-11-13 ASSESSMENT — PAIN SCALES - GENERAL: PAINLEVEL: 2

## 2023-11-13 ASSESSMENT — PATIENT HEALTH QUESTIONNAIRE - PHQ9
1. LITTLE INTEREST OR PLEASURE IN DOING THINGS: NOT AT ALL
SUM OF ALL RESPONSES TO PHQ9 QUESTIONS 1 AND 2: 0
2. FEELING DOWN, DEPRESSED OR HOPELESS: NOT AT ALL

## 2023-11-13 NOTE — PROGRESS NOTES
"Subjective   Patient ID: Susu Martin is a 58 y.o. female who presents for Annual Exam and Hypertension (Has been higher then normal wanted to make sure her medication was working ).    HPI   Susu was seen today for an annual wellness exam, as well as a review of her hypertension and anxiety.  Medication(s) are being taken and tolerated as prescribed, without concerns, list reconciled today.  There are no complaints of chest pain, shortness of breath, lower extremity edema, or exertional concerns  She uses her albuterol inhaler infrequently, 1-2 times a month, often associated with allergies.  Labs from November of a year ago reviewed.  All reassuring, total and LDL cholesterol are minimally elevated.  She has bilateral knee pain and arthritis, sees orthopedics at the [facility], had a right knee injection 3 months ago, still is doing well.  She had a brief stretch where she was getting bilateral gastrocnemius cramps, have resolved.  She tried 1 dose of magnesium, noticed eye swelling, allergy symptoms the next day, uncertain if related.  She has not taken since.  Review of Systems  The full review of systems is negative with the exception of what is noted in HPI    Objective   /74 (BP Location: Right arm, Patient Position: Sitting, BP Cuff Size: Adult)   Pulse 68   Temp 36.9 °C (98.4 °F) (Temporal)   Resp 16   Ht 1.676 m (5' 6\")   Wt 98.5 kg (217 lb 1.6 oz)   LMP  (LMP Unknown)   SpO2 97%   BMI 35.04 kg/m²     Physical Exam  Constitutional/General appearance: alert, oriented, well-appearing, in no distress  Head and face exam is normal  No scleral icterus or conjunctival erythema present  Hearing is grossly normal  Respiratory effort is normal, no dyspnea noted  Cortical function is normal  Mood, affect, are pleasant, appropriate, and interactive.  Insight is normal  Cardiac exam reveals a regular rate and rhythm, no murmurs, rubs or gallops present.   Lungs are clear bilaterally.    No " lower extremity edema present.    Assessment/Plan     Today your wellness care was reviewed.  Please keep up your vision and dental care.  Focus on lifestyle efforts, including a goal of 30 minutes of exercise 5 days/week.    A healthy diet rich in fruits, vegetables, and lean proteins encouraged.  Healthy fats, such as can be found in nuts, olives, avocados are encouraged (unless allergies prohibit).  Avoid/minimize junk and fast food    Diagnostics will be checked as/if discussed  Gynecology care as per Dr. Thorpe  GI care per the [facility]    Hypertension--- since today's blood pressures are at goal, I have recommended continuing the current treatment regimen, including medication as noted above, as well as a low salt, low-fat, high-fiber diet.  Exercise is to be continued as able and tolerated.  We will continue to follow the high blood pressure on an every six-month basis, and address additional needs should they arise.    Anxiety----we will continue the current regimen, including medications, as noted above.  Refills were provided, as needed.  I recommend continuing to work on a healthy diet, regular exercise, and minimizing caffeine and alcohol.  Good sleep hygiene is encouraged.  Follow up will be in 6 months, or sooner should the need arise    Coronary artery CT scan ordered    Follow up in 6 months to recheck Lexapro and blood pressure.  **Portions of this medical record have been created using voice recognition software and may have minor errors which are inherent in voice recognition systems. It has not been fully edited for typographical or grammatical errors**

## 2023-11-13 NOTE — PROGRESS NOTES
Chaperone Present: Declined.  Chaperone Name/Title: giana kelley  Examination Chaperoned: Entire Physical Exam

## 2023-11-14 ENCOUNTER — ANCILLARY PROCEDURE (OUTPATIENT)
Dept: RADIOLOGY | Facility: CLINIC | Age: 58
End: 2023-11-14
Payer: COMMERCIAL

## 2023-11-14 DIAGNOSIS — Z00.00 HEALTH CARE MAINTENANCE: ICD-10-CM

## 2023-11-14 DIAGNOSIS — I10 PRIMARY HYPERTENSION: ICD-10-CM

## 2023-11-14 LAB
25(OH)D3 SERPL-MCNC: 41 NG/ML (ref 30–100)
ALBUMIN SERPL BCP-MCNC: 4.6 G/DL (ref 3.4–5)
ALP SERPL-CCNC: 65 U/L (ref 33–110)
ALT SERPL W P-5'-P-CCNC: 26 U/L (ref 7–45)
ANION GAP SERPL CALC-SCNC: 15 MMOL/L (ref 10–20)
APPEARANCE UR: ABNORMAL
AST SERPL W P-5'-P-CCNC: 19 U/L (ref 9–39)
BACTERIA #/AREA URNS AUTO: ABNORMAL /HPF
BASOPHILS # BLD AUTO: 0.06 X10*3/UL (ref 0–0.1)
BASOPHILS NFR BLD AUTO: 0.9 %
BILIRUB SERPL-MCNC: 1 MG/DL (ref 0–1.2)
BILIRUB UR STRIP.AUTO-MCNC: NEGATIVE MG/DL
BUN SERPL-MCNC: 20 MG/DL (ref 6–23)
CALCIUM SERPL-MCNC: 9.6 MG/DL (ref 8.6–10.6)
CHLORIDE SERPL-SCNC: 101 MMOL/L (ref 98–107)
CHOLEST SERPL-MCNC: 198 MG/DL (ref 0–199)
CHOLESTEROL/HDL RATIO: 3.6
CO2 SERPL-SCNC: 28 MMOL/L (ref 21–32)
COLOR UR: YELLOW
CREAT SERPL-MCNC: 0.66 MG/DL (ref 0.5–1.05)
EOSINOPHIL # BLD AUTO: 0.25 X10*3/UL (ref 0–0.7)
EOSINOPHIL NFR BLD AUTO: 3.7 %
ERYTHROCYTE [DISTWIDTH] IN BLOOD BY AUTOMATED COUNT: 12.2 % (ref 11.5–14.5)
GFR SERPL CREATININE-BSD FRML MDRD: >90 ML/MIN/1.73M*2
GLUCOSE SERPL-MCNC: 91 MG/DL (ref 74–99)
GLUCOSE UR STRIP.AUTO-MCNC: NEGATIVE MG/DL
HCT VFR BLD AUTO: 41.8 % (ref 36–46)
HDLC SERPL-MCNC: 55.6 MG/DL
HGB BLD-MCNC: 13.2 G/DL (ref 12–16)
IMM GRANULOCYTES # BLD AUTO: 0.02 X10*3/UL (ref 0–0.7)
IMM GRANULOCYTES NFR BLD AUTO: 0.3 % (ref 0–0.9)
KETONES UR STRIP.AUTO-MCNC: NEGATIVE MG/DL
LDLC SERPL CALC-MCNC: 110 MG/DL
LEUKOCYTE ESTERASE UR QL STRIP.AUTO: ABNORMAL
LYMPHOCYTES # BLD AUTO: 2.01 X10*3/UL (ref 1.2–4.8)
LYMPHOCYTES NFR BLD AUTO: 29.9 %
MAGNESIUM SERPL-MCNC: 2.26 MG/DL (ref 1.6–2.4)
MCH RBC QN AUTO: 29.7 PG (ref 26–34)
MCHC RBC AUTO-ENTMCNC: 31.6 G/DL (ref 32–36)
MCV RBC AUTO: 94 FL (ref 80–100)
MONOCYTES # BLD AUTO: 0.37 X10*3/UL (ref 0.1–1)
MONOCYTES NFR BLD AUTO: 5.5 %
NEUTROPHILS # BLD AUTO: 4.01 X10*3/UL (ref 1.2–7.7)
NEUTROPHILS NFR BLD AUTO: 59.7 %
NITRITE UR QL STRIP.AUTO: NEGATIVE
NON HDL CHOLESTEROL: 142 MG/DL (ref 0–149)
NRBC BLD-RTO: 0 /100 WBCS (ref 0–0)
PH UR STRIP.AUTO: 5 [PH]
PLATELET # BLD AUTO: 335 X10*3/UL (ref 150–450)
POTASSIUM SERPL-SCNC: 4.7 MMOL/L (ref 3.5–5.3)
PROT SERPL-MCNC: 7.1 G/DL (ref 6.4–8.2)
PROT UR STRIP.AUTO-MCNC: NEGATIVE MG/DL
RBC # BLD AUTO: 4.44 X10*6/UL (ref 4–5.2)
RBC # UR STRIP.AUTO: NEGATIVE /UL
RBC #/AREA URNS AUTO: ABNORMAL /HPF
SODIUM SERPL-SCNC: 139 MMOL/L (ref 136–145)
SP GR UR STRIP.AUTO: 1.02
SQUAMOUS #/AREA URNS AUTO: ABNORMAL /HPF
TRIGL SERPL-MCNC: 161 MG/DL (ref 0–149)
TSH SERPL-ACNC: 1.43 MIU/L (ref 0.44–3.98)
UROBILINOGEN UR STRIP.AUTO-MCNC: <2 MG/DL
VLDL: 32 MG/DL (ref 0–40)
WBC # BLD AUTO: 6.7 X10*3/UL (ref 4.4–11.3)
WBC #/AREA URNS AUTO: ABNORMAL /HPF

## 2023-11-14 PROCEDURE — 75571 CT HRT W/O DYE W/CA TEST: CPT

## 2023-11-16 ENCOUNTER — TELEPHONE (OUTPATIENT)
Dept: PRIMARY CARE | Facility: CLINIC | Age: 58
End: 2023-11-16
Payer: COMMERCIAL

## 2023-11-16 DIAGNOSIS — K76.0 FATTY LIVER: ICD-10-CM

## 2023-11-16 DIAGNOSIS — F41.8 SITUATIONAL ANXIETY: Primary | ICD-10-CM

## 2023-11-16 DIAGNOSIS — R91.1 PULMONARY NODULE: Primary | ICD-10-CM

## 2023-11-16 NOTE — TELEPHONE ENCOUNTER
Covering for PCP.  Chart reviewed.      Coronary Calcium  = 0 .  Low Cardiac Risk     NEW PRoBLEM  Pulmonary nodule,  6 mm   Fatty liver  New dxis .  Reviewed etiology ,  dxis,   eval, tx and follow up . Questions addressed .    Recommend liver ultrasound in 6 months.  Reassured pt liver function is normal. Recommend less/ no alcohol .  Keep lipids low , as they are currently .     Pulmo nodule-  rec CT in 1 yr .  She recently quit smoking so this is good , continue to abstain from smoking.

## 2023-11-16 NOTE — TELEPHONE ENCOUNTER
Pt calling for refill of her Lexapro 10 mg sent to her [facility] and a 90 day to [facility] mail away.

## 2023-11-17 RX ORDER — ESCITALOPRAM OXALATE 10 MG/1
10 TABLET ORAL DAILY
Qty: 90 TABLET | Refills: 3 | Status: SHIPPED | OUTPATIENT
Start: 2023-11-17 | End: 2023-11-30 | Stop reason: ENTERED-IN-ERROR

## 2023-11-25 NOTE — RESULT ENCOUNTER NOTE
Labs are reassuring, cholesterol a little better than last check.  Continue current medication, focus on healthy diet/activity efforts

## 2023-11-27 ENCOUNTER — TELEPHONE (OUTPATIENT)
Dept: PRIMARY CARE | Facility: CLINIC | Age: 58
End: 2023-11-27
Payer: COMMERCIAL

## 2023-11-27 NOTE — TELEPHONE ENCOUNTER
Called pt about lab results. She had brought up having a US of the liver in 6 mnths she wanted to know if this is something that you want her to have?

## 2023-11-28 DIAGNOSIS — F41.8 SITUATIONAL ANXIETY: ICD-10-CM

## 2023-11-29 NOTE — TELEPHONE ENCOUNTER
No ultrasound of liver needed.  CT scan showed some fatty liver changes, is common, weight loss, low fat diet can help.  No worrisome findings were seen in liver

## 2023-11-30 ENCOUNTER — TELEPHONE (OUTPATIENT)
Dept: PRIMARY CARE | Facility: CLINIC | Age: 58
End: 2023-11-30

## 2023-11-30 RX ORDER — ESCITALOPRAM OXALATE 10 MG/1
TABLET ORAL
Qty: 90 TABLET | Refills: 3 | Status: SHIPPED | OUTPATIENT
Start: 2023-11-30

## 2023-11-30 NOTE — TELEPHONE ENCOUNTER
Pt called trying to get her Lexapro. She was advised by [facility] for her to get name brand it needs a prior authorization. Pt is almost out. Pt was given a phone number to call to start the [phone]. Pt would like a cb once it's approved.

## 2023-11-30 NOTE — TELEPHONE ENCOUNTER
Did a PA but it was for generic lexapro which is covered patient wants brand name, please resend with brand name Lexapro to the pharmacy so I can do a PA for that. Thank you.

## 2023-11-30 NOTE — TELEPHONE ENCOUNTER
The original RX was sent over as escitlopram and not Lexapro, Lexapro needs sent over and marked at [facility]

## 2023-12-24 DIAGNOSIS — I10 PRIMARY HYPERTENSION: ICD-10-CM

## 2023-12-26 RX ORDER — HYDROCHLOROTHIAZIDE 12.5 MG/1
12.5 TABLET ORAL DAILY
Qty: 90 TABLET | Refills: 3 | Status: SHIPPED | OUTPATIENT
Start: 2023-12-26

## 2024-01-29 ENCOUNTER — TELEPHONE (OUTPATIENT)
Dept: PRIMARY CARE | Facility: CLINIC | Age: 59
End: 2024-01-29

## 2024-01-29 NOTE — TELEPHONE ENCOUNTER
Pt calling said that her Lexapro 10 mg needs a prior auth with [facility].   Said they require every year.   Please call pt once approved.

## 2024-01-30 NOTE — TELEPHONE ENCOUNTER
View all authorizations for this medication   Closed   1/29/2024  7:23 PM  Case ID: [id number] Close reason: Prior Authorization not required for patient/medication  Note from payer: This medication or product is on your plan's list of covered drugs. Prior authorization is not required at this time. If your pharmacy has questions regarding the processing of your prescription, please have them call the [employer] pharmacy help desk at [phone]. **Please note: This request was submitted electronically. Formulary lowering, tiering exception, cost reduction and/or pre-benefit determination review (including prospective Medicare hospice reviews) requests cannot be requested using this method of submission. Providers contact us at [phone] for further assistance.   Payer: [employer] Patient's Payer    [phone]          States that prior auth is not needed for this medication

## 2024-01-30 NOTE — TELEPHONE ENCOUNTER
Call and spoke with pt to make her aware that a prior auth isn't needed. Pt verbalized understanding. Sent message to pts my chart as well.

## 2024-02-20 ENCOUNTER — TELEPHONE (OUTPATIENT)
Dept: PRIMARY CARE | Facility: CLINIC | Age: 59
End: 2024-02-20

## 2024-02-20 NOTE — TELEPHONE ENCOUNTER
Since her brand-name Lexapro was denied, I drafted an appeal letter that needs to be faxed to [facility].  Printed, signed, placed at Corie's desk.  Denial letter and information in chart

## 2024-03-01 ENCOUNTER — OFFICE VISIT (OUTPATIENT)
Dept: PRIMARY CARE | Facility: CLINIC | Age: 59
End: 2024-03-01
Payer: COMMERCIAL

## 2024-03-01 VITALS
TEMPERATURE: 98.4 F | SYSTOLIC BLOOD PRESSURE: 126 MMHG | HEART RATE: 70 BPM | DIASTOLIC BLOOD PRESSURE: 80 MMHG | RESPIRATION RATE: 14 BRPM | OXYGEN SATURATION: 97 %

## 2024-03-01 DIAGNOSIS — J01.00 ACUTE NON-RECURRENT MAXILLARY SINUSITIS: Primary | ICD-10-CM

## 2024-03-01 PROCEDURE — 3079F DIAST BP 80-89 MM HG: CPT | Performed by: FAMILY MEDICINE

## 2024-03-01 PROCEDURE — 99213 OFFICE O/P EST LOW 20 MIN: CPT | Performed by: FAMILY MEDICINE

## 2024-03-01 PROCEDURE — 1036F TOBACCO NON-USER: CPT | Performed by: FAMILY MEDICINE

## 2024-03-01 PROCEDURE — 3074F SYST BP LT 130 MM HG: CPT | Performed by: FAMILY MEDICINE

## 2024-03-01 RX ORDER — AMOXICILLIN AND CLAVULANATE POTASSIUM 875; 125 MG/1; MG/1
875 TABLET, FILM COATED ORAL 2 TIMES DAILY
Qty: 14 TABLET | Refills: 0 | Status: SHIPPED | OUTPATIENT
Start: 2024-03-01 | End: 2024-03-08

## 2024-03-01 NOTE — PROGRESS NOTES
Subjective   Patient ID: Susu Martin is a 58 y.o. female who presents for URI.     HPI   duration: 7 days   facial pain/pressure:N  facial swelling:N  dental pain:N  nasal discharge:Y  congestion:N  ear pain:Y  fever: N  body aches:Y  chills:Y  sore throat:Y  swollen glands: N  cough:N  General fatigue:Y  Headache:Y  Nasal itching:N  Nose bleeds:N  Sneezing:N  Wheezing:N  Hives:N    treatment: TYLENOL COLD/SINUS    Review of Systems  See HPI    Objective   LMP  (LMP Unknown)     Physical Exam  Constitutional: Well developed, well nourished, alert and in no acute distress.  Head and Face: NC/AT  Eyes: Normal external exam.   ENT: External inspection of ears normal, tympanic membranes visualized and normal. Nasal mucosa and turbinates swollen and erythematous, clear nasal discharge present. Oral mucosa moist, oropharynx clear without tonsillar exudate or erythema.   Neck: Supple. + cervical lymphadenopathy   Cardiovascular: Regular rate and rhythm, normal S1 and S2, no murmurs, gallops, or rubs.   Pulmonary: No respiratory distress, lungs clear to auscultation bilaterally. No wheezes, rhonchi, rales.  Skin: Warm, well perfused, normal skin turgor and color.   Neurologic: Cranial nerves II-XII grossly intact.    Assessment/Plan   UPPER RESPIRATORY INFECTION PLAN:  Fill antibiotic  - Consider trial of daily probiotic to help minimize GI side effects- Align, Culturelle, DanActive, Florastor are recommended.     Drink at least 6-8 glasses of water daily to thin secretions.  Mucinex can be used if secretions remain thick.      A teaspoon of honey every 4 hours as needed for cough has been shown to reduce cough as well or better than over-the-counter cough suppressants.  Delsym or Robitussin are recommended to stop cough.  Cough drops can also be helpful.     For nasal congestion, please use Pedro Med Sinus Rinse at least once daily to rinse out your sinuses. You can also try Flonase nasal spray over the counter - 1-2  sprays in each nostril daily. You can also consider Sudafed or Phenylephrine for nasal congestion but avoid if have hypertension and be aware can stimulate and cause problems sleeping.  Do not use for more than 5 days. Afrin decongestant nasal spray (oxymetazoline) can also be used for 2-3 days only.     For drainage problems, try Allegra, Claritin or Zyrtec.      For sore throat, try honey in tea, Chloraseptic, Cepacol throat lozenges, and salt water gargles.      Fever or aches can be helped by taking acetaminophen (Tylenol) every four hours as needed, or ibuprofen (Motrin, Advil) or naproxen (Aleve) as directed if you are able.   Maximum dosing of ibuprofen is 800 mg every 8 hours and maximum dose of tylenol is 1,000 mg every 8 hours - do not use for longer than 1 week unless directed by your doctor.       If you should develop a fever and worsening cough or nasal secretions with consistent yellow or green phlegm, please contact us.

## 2024-03-05 NOTE — TELEPHONE ENCOUNTER
A representative from [facility] left a message on [facility] refill line advising that appeal letter for Lexapro faxed on 02/21/2024 was not received.   Re-faxed appeal letter to [facility] at number provided by [facility] [phone]. Received confirmation of successful transmission.  Called and updated Pt.

## 2024-03-22 DIAGNOSIS — J45.909 REACTIVE AIRWAY DISEASE WITHOUT COMPLICATION, UNSPECIFIED ASTHMA SEVERITY, UNSPECIFIED WHETHER PERSISTENT (HHS-HCC): ICD-10-CM

## 2024-03-22 RX ORDER — ALBUTEROL SULFATE 90 UG/1
2 AEROSOL, METERED RESPIRATORY (INHALATION) EVERY 4 HOURS PRN
Qty: 18 G | Refills: 0 | Status: SHIPPED | OUTPATIENT
Start: 2024-03-22

## 2024-05-22 ENCOUNTER — OFFICE VISIT (OUTPATIENT)
Dept: PRIMARY CARE | Facility: CLINIC | Age: 59
End: 2024-05-22
Payer: COMMERCIAL

## 2024-05-22 VITALS
HEART RATE: 74 BPM | DIASTOLIC BLOOD PRESSURE: 72 MMHG | BODY MASS INDEX: 33.64 KG/M2 | OXYGEN SATURATION: 97 % | WEIGHT: 208.4 LBS | SYSTOLIC BLOOD PRESSURE: 116 MMHG | TEMPERATURE: 98.4 F

## 2024-05-22 DIAGNOSIS — I10 PRIMARY HYPERTENSION: ICD-10-CM

## 2024-05-22 DIAGNOSIS — F41.8 SITUATIONAL ANXIETY: ICD-10-CM

## 2024-05-22 PROCEDURE — 3074F SYST BP LT 130 MM HG: CPT | Performed by: FAMILY MEDICINE

## 2024-05-22 PROCEDURE — 3078F DIAST BP <80 MM HG: CPT | Performed by: FAMILY MEDICINE

## 2024-05-22 PROCEDURE — 1036F TOBACCO NON-USER: CPT | Performed by: FAMILY MEDICINE

## 2024-05-22 PROCEDURE — 99214 OFFICE O/P EST MOD 30 MIN: CPT | Performed by: FAMILY MEDICINE

## 2024-05-22 NOTE — PROGRESS NOTES
Subjective   Patient ID: Susu Martin is a 59 y.o. female who presents for Follow-up (Pt is here to follow up on HTN, Anx. Pt states she has no new concerns to discuss today. ).    HPI   Susu was seen today for a routine follow-up of her hypertension, anxiety.  She is taking and tolerating hydrochlorothiazide and Lexapro the latter brand name.  She feels well overall, has had an increase in allergy symptoms, particularly a cough when outdoors.  Zyrtec as needed seems to help.   Labs from November were reviewed, all reassuring.  Mammogram and colonoscopy are up to date.  Vaccines are up-to-date including Shingrix series and Tdap.  Review of Systems  The full, 10+ multi-organ review of systems, is within normal limits with the exception of what is noted above in HPI.  Objective   /72 (BP Location: Right arm, Patient Position: Sitting, BP Cuff Size: Large adult)   Pulse 74   Temp 36.9 °C (98.4 °F) (Temporal)   Wt 94.5 kg (208 lb 6.4 oz)   LMP  (LMP Unknown)   SpO2 97%   BMI 33.64 kg/m²     Physical Exam  Constitutional/General appearance: alert, oriented, well-appearing, in no distress  Head and face exam is normal  No scleral icterus or conjunctival erythema present  Hearing is grossly normal  Respiratory effort is normal, no dyspnea noted  Cortical function is normal  Mood, affect, are pleasant, appropriate, and interactive.  Insight is normal  Cardiac exam reveals a regular rate and rhythm, no murmurs, rubs or gallops present.   Lungs are clear bilaterally.    No lower extremity edema present.    Assessment/Plan     Hypertension, continue hydrochlorothiazide, blood pressures have been excellent.  Will recheck at next visit with fasting labs.    Anxiety, likewise stable on brand name Lexapro, refills are up-to-date.    Follow-up in 6 months, with physical exam.  **Portions of this medical record have been created using voice recognition software and may have minor errors which are inherent in voice  recognition systems. It has not been fully edited for typographical or grammatical errors**

## 2024-10-15 ENCOUNTER — OFFICE VISIT (OUTPATIENT)
Dept: PRIMARY CARE | Facility: CLINIC | Age: 59
End: 2024-10-15

## 2024-10-15 ENCOUNTER — HOSPITAL ENCOUNTER (OUTPATIENT)
Dept: RADIOLOGY | Facility: CLINIC | Age: 59
Discharge: HOME | End: 2024-10-15
Payer: COMMERCIAL

## 2024-10-15 VITALS
TEMPERATURE: 98.3 F | OXYGEN SATURATION: 97 % | RESPIRATION RATE: 16 BRPM | HEART RATE: 79 BPM | DIASTOLIC BLOOD PRESSURE: 76 MMHG | SYSTOLIC BLOOD PRESSURE: 135 MMHG

## 2024-10-15 DIAGNOSIS — R07.89 PAIN OF STERNUM: ICD-10-CM

## 2024-10-15 DIAGNOSIS — R07.9 CHEST PAIN, UNSPECIFIED TYPE: ICD-10-CM

## 2024-10-15 DIAGNOSIS — R07.89 PAIN OF STERNUM: Primary | ICD-10-CM

## 2024-10-15 PROCEDURE — 71100 X-RAY EXAM RIBS UNI 2 VIEWS: CPT | Mod: LEFT SIDE | Performed by: RADIOLOGY

## 2024-10-15 PROCEDURE — 3078F DIAST BP <80 MM HG: CPT | Performed by: FAMILY MEDICINE

## 2024-10-15 PROCEDURE — 71120 X-RAY EXAM BREASTBONE 2/>VWS: CPT | Performed by: RADIOLOGY

## 2024-10-15 PROCEDURE — 71120 X-RAY EXAM BREASTBONE 2/>VWS: CPT

## 2024-10-15 PROCEDURE — 1036F TOBACCO NON-USER: CPT | Performed by: FAMILY MEDICINE

## 2024-10-15 PROCEDURE — 99214 OFFICE O/P EST MOD 30 MIN: CPT | Performed by: FAMILY MEDICINE

## 2024-10-15 PROCEDURE — 93000 ELECTROCARDIOGRAM COMPLETE: CPT | Performed by: FAMILY MEDICINE

## 2024-10-15 PROCEDURE — 3075F SYST BP GE 130 - 139MM HG: CPT | Performed by: FAMILY MEDICINE

## 2024-10-15 PROCEDURE — 71100 X-RAY EXAM RIBS UNI 2 VIEWS: CPT | Mod: LT

## 2024-10-15 RX ORDER — MELOXICAM 15 MG/1
15 TABLET ORAL DAILY
Qty: 30 TABLET | Refills: 0 | Status: SHIPPED | OUTPATIENT
Start: 2024-10-15 | End: 2024-11-14

## 2024-10-15 ASSESSMENT — ENCOUNTER SYMPTOMS
CHEST TIGHTNESS: 0
SHORTNESS OF BREATH: 0
COUGH: 0

## 2024-10-15 NOTE — PROGRESS NOTES
Subjective   Patient ID: Susu Martin is a 59 y.o. female who presents for Chest Pain (X6 weeks).    HPI   +stress-lost grandchild during delivery recently  Reports she has a sternum pain  She is followed by a Cardiologist due to a mild murmur, not followed regularly or yearly. Patient did not contact cardiology.  Pain is intermittent. She can recreate the pain with palpation.   Has no difference in sensation due to breathing.   No recent illness, cough  Had symptoms prior to [place] trip 9/15  Has not taken anything for the discomfort, other than tylenol for generalized muscle tension from stress  No pain with pushing, pulling  Denies acid reflux    Review of Systems   Respiratory:  Negative for cough, chest tightness and shortness of breath.    Cardiovascular:  Positive for chest pain.   All other systems reviewed and are negative.    Objective   /76 (BP Location: Right arm, Patient Position: Sitting, BP Cuff Size: Large adult)   Pulse 79   Temp 36.8 °C (98.3 °F) (Temporal)   Resp 16   LMP  (LMP Unknown)   SpO2 97%     Physical Exam  Constitutional: Well developed, well nourished, alert and in no acute distress   Eyes: Normal external exam.   Cardiovascular: Regular rate and rhythm, normal S1 and S2, no murmurs, gallops, or rubs. Radial pulses normal. No peripheral edema.  Pulmonary: No respiratory distress, lungs clear to auscultation bilaterally. No wheezes, rhonchi, rales.  Chest: Normal shape and expansion, +TTP (even to light touch) overlying left anteriomedial rib 4. No masses palpated. No skin changes appreciates.   Skin: Warm, well perfused, normal skin turgor and color.   Neurologic: Cranial nerves II-XII grossly intact.   Psychiatric: Mood calm and affect normal.    Assessment/Plan   IO EKG performed-negative for acute abnormalities    Xray sternum and ribs ordered- I will contact you with results    START meloxicam (anti-inflammatory) with food daily x 5 days. Do not take any other  NSAIDs with this (aleve, ibuprofen), but may take tylenol as directed on the bottle as needed with this.    Try to avoid palpating the area, as this can worsen inflammation and pain     Please proceed to the ED if your chest pain worsens

## 2024-10-17 NOTE — RESULT ENCOUNTER NOTE
Negative xray of rib- please continue with our treatment plan and if pain is not resolving please reach back out to the office

## 2024-11-04 DIAGNOSIS — R91.1 PULMONARY NODULE: Primary | ICD-10-CM

## 2024-11-18 ENCOUNTER — HOSPITAL ENCOUNTER (OUTPATIENT)
Dept: RADIOLOGY | Facility: CLINIC | Age: 59
Discharge: HOME | End: 2024-11-18
Payer: COMMERCIAL

## 2024-11-18 DIAGNOSIS — R91.1 PULMONARY NODULE: ICD-10-CM

## 2024-11-18 PROCEDURE — 71250 CT THORAX DX C-: CPT | Performed by: RADIOLOGY

## 2024-11-18 PROCEDURE — 71250 CT THORAX DX C-: CPT

## 2024-11-28 DIAGNOSIS — I10 PRIMARY HYPERTENSION: ICD-10-CM

## 2024-11-29 RX ORDER — HYDROCHLOROTHIAZIDE 12.5 MG/1
12.5 TABLET ORAL DAILY
Qty: 90 TABLET | Refills: 1 | Status: SHIPPED | OUTPATIENT
Start: 2024-11-29

## 2024-12-12 ENCOUNTER — APPOINTMENT (OUTPATIENT)
Dept: PRIMARY CARE | Facility: CLINIC | Age: 59
End: 2024-12-12
Payer: COMMERCIAL

## 2024-12-12 ENCOUNTER — LAB (OUTPATIENT)
Dept: LAB | Facility: LAB | Age: 59
End: 2024-12-12
Payer: COMMERCIAL

## 2024-12-12 VITALS
BODY MASS INDEX: 33.51 KG/M2 | HEIGHT: 66 IN | TEMPERATURE: 97.8 F | WEIGHT: 208.5 LBS | OXYGEN SATURATION: 97 % | HEART RATE: 74 BPM | SYSTOLIC BLOOD PRESSURE: 114 MMHG | DIASTOLIC BLOOD PRESSURE: 78 MMHG

## 2024-12-12 DIAGNOSIS — Z00.00 HEALTH CARE MAINTENANCE: ICD-10-CM

## 2024-12-12 DIAGNOSIS — R91.1 PULMONARY NODULE: ICD-10-CM

## 2024-12-12 DIAGNOSIS — I10 PRIMARY HYPERTENSION: ICD-10-CM

## 2024-12-12 DIAGNOSIS — E55.9 VITAMIN D DEFICIENCY: ICD-10-CM

## 2024-12-12 DIAGNOSIS — I10 PRIMARY HYPERTENSION: Primary | ICD-10-CM

## 2024-12-12 LAB
ABO GROUP (TYPE) IN BLOOD: NORMAL
APPEARANCE UR: CLEAR
BASOPHILS # BLD AUTO: 0.06 X10*3/UL (ref 0–0.1)
BASOPHILS NFR BLD AUTO: 1.2 %
BILIRUB UR STRIP.AUTO-MCNC: NEGATIVE MG/DL
COLOR UR: YELLOW
EOSINOPHIL # BLD AUTO: 0.18 X10*3/UL (ref 0–0.7)
EOSINOPHIL NFR BLD AUTO: 3.7 %
ERYTHROCYTE [DISTWIDTH] IN BLOOD BY AUTOMATED COUNT: 12.2 % (ref 11.5–14.5)
EST. AVERAGE GLUCOSE BLD GHB EST-MCNC: 105 MG/DL
GLUCOSE UR STRIP.AUTO-MCNC: NORMAL MG/DL
HBA1C MFR BLD: 5.3 %
HCT VFR BLD AUTO: 41.5 % (ref 36–46)
HGB BLD-MCNC: 13.6 G/DL (ref 12–16)
IMM GRANULOCYTES # BLD AUTO: 0.02 X10*3/UL (ref 0–0.7)
IMM GRANULOCYTES NFR BLD AUTO: 0.4 % (ref 0–0.9)
KETONES UR STRIP.AUTO-MCNC: NEGATIVE MG/DL
LEUKOCYTE ESTERASE UR QL STRIP.AUTO: ABNORMAL
LYMPHOCYTES # BLD AUTO: 1.61 X10*3/UL (ref 1.2–4.8)
LYMPHOCYTES NFR BLD AUTO: 33.1 %
MCH RBC QN AUTO: 29.6 PG (ref 26–34)
MCHC RBC AUTO-ENTMCNC: 32.8 G/DL (ref 32–36)
MCV RBC AUTO: 90 FL (ref 80–100)
MONOCYTES # BLD AUTO: 0.25 X10*3/UL (ref 0.1–1)
MONOCYTES NFR BLD AUTO: 5.1 %
MUCOUS THREADS #/AREA URNS AUTO: NORMAL /LPF
NEUTROPHILS # BLD AUTO: 2.74 X10*3/UL (ref 1.2–7.7)
NEUTROPHILS NFR BLD AUTO: 56.5 %
NITRITE UR QL STRIP.AUTO: NEGATIVE
NRBC BLD-RTO: 0 /100 WBCS (ref 0–0)
PH UR STRIP.AUTO: 6.5 [PH]
PLATELET # BLD AUTO: 376 X10*3/UL (ref 150–450)
PROT UR STRIP.AUTO-MCNC: ABNORMAL MG/DL
RBC # BLD AUTO: 4.59 X10*6/UL (ref 4–5.2)
RBC # UR STRIP.AUTO: NEGATIVE /UL
RBC #/AREA URNS AUTO: NORMAL /HPF
RENAL EPI CELLS #/AREA UR COMP ASSIST: NORMAL /HPF
RH FACTOR (ANTIGEN D): NORMAL
SP GR UR STRIP.AUTO: 1.03
SQUAMOUS #/AREA URNS AUTO: NORMAL /HPF
UROBILINOGEN UR STRIP.AUTO-MCNC: NORMAL MG/DL
WBC # BLD AUTO: 4.9 X10*3/UL (ref 4.4–11.3)
WBC #/AREA URNS AUTO: NORMAL /HPF

## 2024-12-12 PROCEDURE — 85025 COMPLETE CBC W/AUTO DIFF WBC: CPT

## 2024-12-12 PROCEDURE — 3078F DIAST BP <80 MM HG: CPT | Performed by: FAMILY MEDICINE

## 2024-12-12 PROCEDURE — 81001 URINALYSIS AUTO W/SCOPE: CPT

## 2024-12-12 PROCEDURE — 86901 BLOOD TYPING SEROLOGIC RH(D): CPT

## 2024-12-12 PROCEDURE — 82306 VITAMIN D 25 HYDROXY: CPT

## 2024-12-12 PROCEDURE — 3074F SYST BP LT 130 MM HG: CPT | Performed by: FAMILY MEDICINE

## 2024-12-12 PROCEDURE — 3008F BODY MASS INDEX DOCD: CPT | Performed by: FAMILY MEDICINE

## 2024-12-12 PROCEDURE — 99396 PREV VISIT EST AGE 40-64: CPT | Performed by: FAMILY MEDICINE

## 2024-12-12 PROCEDURE — 84443 ASSAY THYROID STIM HORMONE: CPT

## 2024-12-12 PROCEDURE — 36415 COLL VENOUS BLD VENIPUNCTURE: CPT

## 2024-12-12 PROCEDURE — 83036 HEMOGLOBIN GLYCOSYLATED A1C: CPT

## 2024-12-12 PROCEDURE — 86900 BLOOD TYPING SEROLOGIC ABO: CPT

## 2024-12-12 PROCEDURE — 80061 LIPID PANEL: CPT

## 2024-12-12 PROCEDURE — 80053 COMPREHEN METABOLIC PANEL: CPT

## 2024-12-12 RX ORDER — MULTIVIT-MIN/IRON FUM/FOLIC AC 7.5 MG-4
1 TABLET ORAL DAILY
COMMUNITY

## 2024-12-12 NOTE — PROGRESS NOTES
"Subjective   Patient ID: Susu Martin is a 59 y.o. female who presents for Annual Exam (Pt presents for annual physical exam- ABN was given to pt and signed, pt verbalized understanding. Pt states that she has a mole on her face that she would like you to take a look at, would like to review her CT results) and Flu Vaccine (Pt declines flu vaccine at this time. /).    HPI   Susu was seen today for a follow-up and review of her medications, as well as an annual wellness review.  Medication(s) are being taken and tolerated as prescribed, without concerns, list reconciled today.  There are no complaints of chest pain, shortness of breath, lower extremity edema, or exertional concerns  Gynecology care is provided by Dr. Da Thorpe, including mammogram and Pap smear.  She would like a flu vaccine today.  Tdap is up-to-date as of 2017  Shingrix series completed 2020  Colonoscopy up-to-date as of 2021.  Susu had a CT scan from November 19, has a 1 year recheck of pulmonary nodules.  All are subcentimeter, similar or smaller in size than last check.  She is comfortable with the tolerability and efficacy of her current dose of Lexapro, wishes for no change.  That being said, she has been under a good deal more stress over the last several months, because of the full term passing of a granddaughter.  Susu has had trouble sleeping.  Review of Systems  Constitutional/General appearance: alert, oriented, well-appearing, in no distress  Head and face exam is normal  No scleral icterus or conjunctival erythema present  Hearing is grossly normal  Respiratory effort is normal, no dyspnea noted  Cortical function is normal  Mood, affect, are pleasant, appropriate, and interactive.  Insight is normal    Objective   /78 (BP Location: Right arm, Patient Position: Sitting, BP Cuff Size: Large adult)   Pulse 74   Temp 36.6 °C (97.8 °F) (Temporal)   Ht 1.676 m (5' 6\")   Wt 94.6 kg (208 lb 8 oz)   LMP  (LMP Unknown) "   SpO2 97%   BMI 33.65 kg/m²     Physical Exam  Constitutional/General appearance: alert, oriented, well-appearing, in no distress  Head and face exam is normal  No scleral icterus or conjunctival erythema present  Hearing is grossly normal  Respiratory effort is normal, no dyspnea noted  Cortical function is normal  Mood, affect, are pleasant, appropriate, and interactive.  Insight is normal  Cardiac exam reveals a regular rate and rhythm, no murmurs, rubs or gallops present.   Lungs are clear bilaterally.    No lower extremity edema present.    Assessment/Plan     Today your wellness care was reviewed.  Please keep up your vision and dental care.  Focus on lifestyle efforts, including a goal of 30 minutes of exercise 5 days/week.    A healthy diet rich in fruits, vegetables, and lean proteins encouraged.  Healthy fats, such as can be found in nuts, olives, avocados are encouraged (unless allergies prohibit).  Avoid/minimize junk and fast food    Continue current medications    For sleep, consider melatonin 5 mg nightly for 1 to 2 weeks, can increase to 10 mg nightly thereafter if needed.    For skin check, recommend:  [facility] [place]   [facility] [place] [address] [phone]    Blood pressure is excellent, continue hydrochlorothiazide.    Follow-up as scheduled  **Portions of this medical record have been created using voice recognition software and may have minor errors which are inherent in voice recognition systems. It has not been fully edited for typographical or grammatical errors**

## 2024-12-13 LAB
25(OH)D3 SERPL-MCNC: 38 NG/ML (ref 30–100)
ALBUMIN SERPL BCP-MCNC: 4.6 G/DL (ref 3.4–5)
ALP SERPL-CCNC: 71 U/L (ref 33–110)
ALT SERPL W P-5'-P-CCNC: 27 U/L (ref 7–45)
ANION GAP SERPL CALC-SCNC: 14 MMOL/L (ref 10–20)
AST SERPL W P-5'-P-CCNC: 20 U/L (ref 9–39)
BILIRUB SERPL-MCNC: 1.1 MG/DL (ref 0–1.2)
BUN SERPL-MCNC: 19 MG/DL (ref 6–23)
CALCIUM SERPL-MCNC: 9.9 MG/DL (ref 8.6–10.6)
CHLORIDE SERPL-SCNC: 100 MMOL/L (ref 98–107)
CHOLEST SERPL-MCNC: 197 MG/DL (ref 0–199)
CHOLESTEROL/HDL RATIO: 4.3
CO2 SERPL-SCNC: 32 MMOL/L (ref 21–32)
CREAT SERPL-MCNC: 0.7 MG/DL (ref 0.5–1.05)
EGFRCR SERPLBLD CKD-EPI 2021: >90 ML/MIN/1.73M*2
GLUCOSE SERPL-MCNC: 91 MG/DL (ref 74–99)
HDLC SERPL-MCNC: 45.8 MG/DL
LDLC SERPL CALC-MCNC: 109 MG/DL
NON HDL CHOLESTEROL: 151 MG/DL (ref 0–149)
POTASSIUM SERPL-SCNC: 4.6 MMOL/L (ref 3.5–5.3)
PROT SERPL-MCNC: 7.2 G/DL (ref 6.4–8.2)
SODIUM SERPL-SCNC: 141 MMOL/L (ref 136–145)
TRIGL SERPL-MCNC: 209 MG/DL (ref 0–149)
TSH SERPL-ACNC: 1.18 MIU/L (ref 0.44–3.98)
VLDL: 42 MG/DL (ref 0–40)

## 2024-12-17 ENCOUNTER — TELEPHONE (OUTPATIENT)
Dept: PRIMARY CARE | Facility: CLINIC | Age: 59
End: 2024-12-17
Payer: COMMERCIAL

## 2024-12-17 NOTE — TELEPHONE ENCOUNTER
Pt cb and was advised. She states she wants to see DJD to show where she is having the pain. I advised her that a message would be sent to see where she can be seen.

## 2024-12-17 NOTE — TELEPHONE ENCOUNTER
Pt called about her urine results. She looked at them online and states she is having discomfort. She asked if something was going to be called in. I asked her if we called her with her results. She stated no. I advised her that DJVIRIDIANA just came back today and he is seeing pt's and we will call once he review the results. She asked if it was going to be today. I advised her I did not know. I advised the pt again that you just got back and had a busy schedule and I can't pull you out of a room to review her results. She got upset and advised me that is not what she is asking. When you get a chance can you please review the pt's results.

## 2024-12-17 NOTE — TELEPHONE ENCOUNTER
The leukocyte esterase that was noted in her urine by itself does not indicate infection, as no blood, white blood cells, or bacteria were seen.  There were a number of normal skin cells seen, which usually indicates contamination of the urine sample.  If she is having UTI symptoms such as burning, frequency, pelvic pressure, would recommend sending a urine culture to more definitively evaluate for infection.  If symptoms are bothersome enough, could start an antibiotic after urine specimen obtained.

## 2024-12-18 ENCOUNTER — TELEPHONE (OUTPATIENT)
Dept: PRIMARY CARE | Facility: CLINIC | Age: 59
End: 2024-12-18

## 2024-12-18 ENCOUNTER — OFFICE VISIT (OUTPATIENT)
Dept: URGENT CARE | Age: 59
End: 2024-12-18
Payer: COMMERCIAL

## 2024-12-18 VITALS
RESPIRATION RATE: 16 BRPM | HEIGHT: 66 IN | OXYGEN SATURATION: 96 % | WEIGHT: 208 LBS | SYSTOLIC BLOOD PRESSURE: 120 MMHG | TEMPERATURE: 97 F | DIASTOLIC BLOOD PRESSURE: 78 MMHG | HEART RATE: 70 BPM | BODY MASS INDEX: 33.43 KG/M2

## 2024-12-18 DIAGNOSIS — R10.32 LEFT LOWER QUADRANT ABDOMINAL PAIN: ICD-10-CM

## 2024-12-18 DIAGNOSIS — K57.32 SIGMOID DIVERTICULITIS: Primary | ICD-10-CM

## 2024-12-18 LAB
POC APPEARANCE, URINE: CLEAR
POC BILIRUBIN, URINE: NEGATIVE
POC BLOOD, URINE: NEGATIVE
POC COLOR, URINE: YELLOW
POC GLUCOSE, URINE: NEGATIVE MG/DL
POC KETONES, URINE: NEGATIVE MG/DL
POC LEUKOCYTES, URINE: NEGATIVE
POC NITRITE,URINE: NEGATIVE
POC PH, URINE: 6 PH
POC PROTEIN, URINE: NEGATIVE MG/DL
POC SPECIFIC GRAVITY, URINE: 1.02
POC UROBILINOGEN, URINE: 0.2 EU/DL

## 2024-12-18 PROCEDURE — 3008F BODY MASS INDEX DOCD: CPT | Performed by: PHYSICIAN ASSISTANT

## 2024-12-18 PROCEDURE — 87086 URINE CULTURE/COLONY COUNT: CPT

## 2024-12-18 PROCEDURE — 3074F SYST BP LT 130 MM HG: CPT | Performed by: PHYSICIAN ASSISTANT

## 2024-12-18 PROCEDURE — 81003 URINALYSIS AUTO W/O SCOPE: CPT | Performed by: PHYSICIAN ASSISTANT

## 2024-12-18 PROCEDURE — 1036F TOBACCO NON-USER: CPT | Performed by: PHYSICIAN ASSISTANT

## 2024-12-18 PROCEDURE — 99214 OFFICE O/P EST MOD 30 MIN: CPT | Performed by: PHYSICIAN ASSISTANT

## 2024-12-18 PROCEDURE — 3078F DIAST BP <80 MM HG: CPT | Performed by: PHYSICIAN ASSISTANT

## 2024-12-18 RX ORDER — METRONIDAZOLE 500 MG/1
TABLET ORAL
Qty: 21 TABLET | Refills: 0 | Status: SHIPPED | OUTPATIENT
Start: 2024-12-18

## 2024-12-18 RX ORDER — CEFDINIR 300 MG/1
CAPSULE ORAL
Qty: 14 CAPSULE | Refills: 0 | Status: SHIPPED | OUTPATIENT
Start: 2024-12-18

## 2024-12-18 NOTE — TELEPHONE ENCOUNTER
Patient called.  She just left UC because she had pain and thought it could be a UTI.  [facility] said it was not a UTI and thought it could be diverticulitis or pulled muscle in her pelvic wall.  Patient would like to know if you can order testing for diverticulitis since the UC said they could not order it for her.

## 2024-12-18 NOTE — TELEPHONE ENCOUNTER
2 antibiotics sent, take with food.  Recommend an otc probiotic such as Align while on antbx.  If pain worsens, a CT scan is the next step.  Lots of fluids, gentle, soft diet for now    Follow up available next week, M-T?

## 2024-12-18 NOTE — TELEPHONE ENCOUNTER
Called pt and notified of Dr Ortega message for antibiotics X2 sent to pharmacy and advised of rest of the message. Pt voiced understanding. MW 12-18-24

## 2024-12-18 NOTE — TELEPHONE ENCOUNTER
Noted.    If it is diverticulitis the pain would be along her left lower abd/pelvic region, double check if that's the case please.  If so, I'll send appropriate antibiotics and recheck early next week.  If not, I'll send prescription strength naprosyn to try for a few days    Our urine here looks ok so far, culture still pending

## 2024-12-18 NOTE — TELEPHONE ENCOUNTER
Patient said that this is wwhere her pain is.  She sqaid she will  the med today.  She also inquired about a sleep aid.  She said you talked about it at her last appt and gave her suggestion , other than Tylonol PM, to take.  She remembers Benadryl but can't remember the name of the second med you suggested and it was not in your note.  Please advise.

## 2024-12-18 NOTE — PROGRESS NOTES
Subjective   Patient ID: Susu Martin is a 59 y.o. female. They present today with a chief complaint of UTI sx    Patient disposition: Home    HISTORY OF PRESENT ILLNESS:    Adult female presents for suspected UTI. She denies urinary urgency or dysuria but has had intermittent crampy pain, moderate at worst, in the left lower quadrant for 3 days. This morning was moderate, right now 0/10. She had a normal BM this morning. Denies nausea, vomiting, f/c/s. Pain aggravated by changes of position or lifting shoulders up when laying on back.    Past Medical History  Allergies as of 12/18/2024 - Reviewed 12/18/2024   Allergen Reaction Noted    Fluoxetine Other 06/13/2015    Wellbutrin [bupropion hcl] Dizziness 06/18/2008       (Not in a hospital admission)       Past Medical History:   Diagnosis Date    Abdominal distension (gaseous) 05/18/2022    Abdominal bloating    Acute bronchitis, unspecified 11/07/2013    Acute bronchitis    Anxiety disorder, unspecified 04/11/2014    Anxiety disorder    Cervicalgia 11/07/2013    Cervicalgia    Cough, unspecified 03/17/2014    Cough    Cough, unspecified 04/07/2017    Cough    Dysuria 03/06/2017    Dysuria    Encounter for general adult medical examination without abnormal findings 11/07/2013    Physical exam, routine    Nicotine dependence, unspecified, uncomplicated 06/02/2014    Light tobacco smoker    Other chest pain 04/07/2017    Chest wall pain    Other conditions influencing health status 11/07/2013    Ocular Motility Disorders    Other specified soft tissue disorders 11/07/2013    Limb swelling    Pain in left foot 03/06/2017    Left foot pain    Pain in left forearm 03/06/2017    Left forearm pain    Pain in right knee 03/06/2017    Right knee pain    Personal history of other drug therapy 03/06/2017    History of drug therapy    Stress fracture, unspecified foot, initial encounter for fracture 03/06/2017    Stress fracture of foot    Unspecified fracture of  "unspecified foot, initial encounter for closed fracture 11/07/2013    Foot fracture       No past surgical history on file.     reports that she has quit smoking. Her smoking use included cigarettes. She has been exposed to tobacco smoke. She has never used smokeless tobacco. She reports current alcohol use of about 4.0 standard drinks of alcohol per week. She reports that she does not use drugs.    Review of Systems    Negative except as documented in the History of Present Illness.                             Objective    Vitals:    12/18/24 0837   BP: 120/78   Pulse: 70   Resp: 16   Temp: 36.1 °C (97 °F)   SpO2: 96%   Weight: 94.3 kg (208 lb)   Height: 1.676 m (5' 6\")     No LMP recorded (lmp unknown). Patient is postmenopausal.      PHYSICAL EXAMINATION:      CONSTITUTIONAL: well-appearing, nontoxic         EYES:   No scleral icterus or orbital trauma noted. No conjunctival injection. PERRLA. No nystagmus.         ENT:  Head and face are unremarkable and atraumatic. Mucous membranes moist. Nares are patent without copious rhinorrhea.         LUNGS:  CTAB, no r/r/w.         CARDIOVASCULAR:   RRR, no m/r/g. Nl S1/S2.         ABDOMEN:    Positive Carnett's sign.  Minimally tender to palpation LLQ lower at inguinal crease. Nondistended, with no obvious masses, and no peritoneal signs.          MUSCULOSKELETAL: No obvious deformities. YOST with equal strength. Gait [observed to be normal.]         SKIN:   Good color, with no significant rashes, pallor, cyanosis, wounds.         NEURO:  Normal baseline mental status. No obvious neurological deficits, normal sensation and strength bilaterally.         PSYCH: Appropriate mood and affect.         -----------------------------------         MDM: UA was normal. Urine culture sent, but urinary process not suspected at this time. Ddx also included hernia, muscle strain, diverticulitis. As noted she was pain-free with relatively benign exam, only mild TTP in the LLQ/pelvis, " and very positive Carnett's sign. Discussed details of case with Dr. Radha RODRIGUEZ, supervising physician, and we agreed since pt has close relationship with PCP she may contact them about our concerns but go to ED if any worsening of sx.        Procedures    Diagnostic study results (if any) were reviewed by Reuben Rosado PA-C.    No results found for this visit on 12/18/24.     Assessment/Plan   Allergies, medications, history, and pertinent labs/EKGs/Imaging reviewed by Reuben Rosado PA-C.     Orders and Diagnoses  Diagnoses and all orders for this visit:  Urinary problem  -     POCT UA Automated manually resulted      Medical Admin Record      Follow Up Instructions  No follow-ups on file.    Electronically signed by Reuben Rosado PA-C  8:47 AM

## 2024-12-19 LAB — BACTERIA UR CULT: NORMAL

## 2025-01-22 ENCOUNTER — TELEPHONE (OUTPATIENT)
Dept: PRIMARY CARE | Facility: CLINIC | Age: 60
End: 2025-01-22
Payer: COMMERCIAL

## 2025-01-23 NOTE — TELEPHONE ENCOUNTER
"Labs from December were all stable, total cholesterol was 197  Sugar normal, kidney, liver function too.  Urine culture negative for infection    For sleep we discussed Z-Quil, which is just liquid benadryl, the same ingredient in all of the otc \"PM\" medications.  Other than melatonin and similar medications, there are not many options other than prescription    "

## 2025-03-25 ENCOUNTER — TELEPHONE (OUTPATIENT)
Dept: PRIMARY CARE | Facility: CLINIC | Age: 60
End: 2025-03-25

## 2025-03-25 NOTE — TELEPHONE ENCOUNTER
Pt called in stating her insurance will be faxing over paperwork to complete for medication approval states she just wanted to inform you regarding this and states its done yearly

## 2025-03-30 PROBLEM — F41.9 ANXIETY AND DEPRESSION: Status: ACTIVE | Noted: 2023-04-17

## 2025-03-30 PROBLEM — F32.A ANXIETY AND DEPRESSION: Status: ACTIVE | Noted: 2023-04-17

## 2025-04-10 ENCOUNTER — TELEPHONE (OUTPATIENT)
Dept: PRIMARY CARE | Facility: CLINIC | Age: 60
End: 2025-04-10
Payer: COMMERCIAL

## 2025-04-10 DIAGNOSIS — F41.9 ANXIETY AND DEPRESSION: Primary | ICD-10-CM

## 2025-04-10 DIAGNOSIS — F32.A ANXIETY AND DEPRESSION: Primary | ICD-10-CM

## 2025-04-10 NOTE — TELEPHONE ENCOUNTER
Please call patient and let her know we are working on this. Our pharmacy team is going to reach out to her about any additional information we need for the appeal.     Kristyn Harry DO, MSEd  [facility]  Office: [phone]  4/10/2025 1:23 PM    1. Anxiety and depression (Primary)  - Referral to Clinical Pharmacy; Future

## 2025-04-11 NOTE — TELEPHONE ENCOUNTER
[facility] with the patient's insurance called and I spoke with them in regards to pt's Lexapro. She states that we need to fill out an appeal form for the Lexapro. She states that she had patient on the other line with her. She states that the patient has called multiple times and was wanting to know if we were able to get the appeal form filled out so that we can get this process moving for the pt. I informed her that I did not have an appeal form and how would I obtain this, and all she told me was to go to the [facility] provider website and I can get one from there. She also gave me the fax number to be able to fax appeal number over. Fax number is [phone]. I informed her that we were already taking care of this on our end (referral for clin pharm), but she insisted that I get the appeal form and fax over for pt. Sending this task to you as an FYI.

## 2025-04-15 NOTE — TELEPHONE ENCOUNTER
Patient has appt tomorrow 4/16/2025 with pharmacy.     Kristyn Harry DO, MSEd  [facility]  Office: [phone]  4/15/2025 4:59 PM

## 2025-04-16 ENCOUNTER — APPOINTMENT (OUTPATIENT)
Dept: PHARMACY | Facility: HOSPITAL | Age: 60
End: 2025-04-16
Payer: COMMERCIAL

## 2025-04-16 DIAGNOSIS — F41.9 ANXIETY AND DEPRESSION: ICD-10-CM

## 2025-04-16 DIAGNOSIS — F32.A ANXIETY AND DEPRESSION: ICD-10-CM

## 2025-04-16 RX ORDER — ESCITALOPRAM OXALATE 10 MG/1
10 TABLET, FILM COATED ORAL DAILY
Qty: 30 TABLET | Refills: 11 | Status: SHIPPED | OUTPATIENT
Start: 2025-04-16

## 2025-04-16 NOTE — PROGRESS NOTES
"Clinical Pharmacy Appointment    Patient ID: Susu Martin is a 60 y.o. female who presents for Anxiety.    Pt is here for First appointment.     Referring Provider: Kristyn Harry DO  PCP: Kristyn Harry DO   Last visit with PCP: 12/12/2024   Next visit with PCP: 12/16/2025    Subjective   HPI  Anxiety/Depression  Clinical Evaluation:   - Tolerating Lexapro (BRAND) 10 mg daily  - No anxiety or depression symptoms     Previous medications:   - Escitalopram - stopped because the generic was ineffective - changed to brand name  - Fluoxetine - stopped for decreased sleep quality, \"felt weird\", ineffective - Tried and failed 20mg 5-1-2015 to 6-   - Wellbutrin - stopped for dizziness - Tried and failed 5/8/2--8 to 6/18/2008     Drug Interactions  No relevant drug interactions were noted.    Medication System Management  Patients preferred pharmacy: [facility] for [facility]   Adherence/Organization: no issues   Affordability/Accessibility: no issues     Objective   Allergies[1]  Social History     Social History Narrative    Not on file      Medication Review  Current Outpatient Medications   Medication Instructions    albuterol 90 mcg/actuation inhaler 2 puffs, inhalation, Every 4 hours PRN    amoxicillin (Amoxil) 500 mg capsule take 4 capsules by mouth one hour prior to dental appointment.    CALCIUM ORAL No dose, route, or frequency recorded.    cefdinir (Omnicef) 300 mg capsule Take 1 capsule twice daily until gone    cholecalciferol (VITAMIN D-3) 2,000 Units, Daily    hydroCHLOROthiazide (MICROZIDE) 12.5 mg, oral, Daily    Lexapro 10 mg, oral, Daily    multivitamin with minerals tablet 1 tablet, Daily    sulfamethoxazole-trimethoprim (Bactrim DS) 800-160 mg tablet 1 tablet, Every 12 hours scheduled (0630,1830)      Vitals  BP Readings from Last 2 Encounters:   12/18/24 120/78   12/12/24 114/78     BMI Readings from Last 1 Encounters:   12/18/24 33.57 kg/m²      Labs  A1C  Lab Results   Component Value Date " "   HGBA1C 5.3 12/12/2024     BMP  Lab Results   Component Value Date    CALCIUM 9.9 12/12/2024     12/12/2024    K 4.6 12/12/2024    CO2 32 12/12/2024     12/12/2024    BUN 19 12/12/2024    CREATININE 0.70 12/12/2024    EGFR >90 12/12/2024     LFTs  Lab Results   Component Value Date    ALT 27 12/12/2024    AST 20 12/12/2024    ALKPHOS 71 12/12/2024    BILITOT 1.1 12/12/2024     FLP  Lab Results   Component Value Date    TRIG 209 (H) 12/12/2024    CHOL 197 12/12/2024    LDLF 132 (H) 11/17/2022    LDLCALC 109 (H) 12/12/2024    HDL 45.8 12/12/2024     Urine Microalbumin  No results found for: \"MICROALBCREA\"  Weight Management  Wt Readings from Last 3 Encounters:   12/18/24 94.3 kg (208 lb)   12/12/24 94.6 kg (208 lb 8 oz)   05/22/24 94.5 kg (208 lb 6.4 oz)      There is no height or weight on file to calculate BMI.     Assessment/Plan   Problem List Items Addressed This Visit       Anxiety and depression    Relevant Medications    Lexapro 10 mg tablet     Patient has been taking Lexapro 10 mg daily for a few years now. Reports symptoms of anxiety and depression have resolved since she was able to start the brand name Lexapro. She tried and failed multiple medications previously, as listed above, including generic escitalopram, fluoxetine, and bupropion. Will submit PA and appeal if needed. Expedited review needed as patient only has a few days left of medication.     PLAN:   Continue:   Lexapro 10 mg daily       Clinical Pharmacist follow-up: PRN, Telehealth visit    Continue all meds under the continuation of care with the referring provider and clinical pharmacy team.    Thank you,  Gila Lui, PharmD  Clinical Pharmacist  [phone]    Verbal consent to manage patient's drug therapy was obtained from the patient. They were informed they may decline to participate or withdraw from participation in pharmacy services at any time.         [1]   Allergies  Allergen Reactions    Fluoxetine Other     " "Decreased sleep quality, \"felt weird\", ineffective   Tried and failed 20mg 5-1-2015 to 6-    Wellbutrin [Bupropion Hcl] Dizziness     Dizziness  Tried and failed 5/8/2--8 to 6/18/2008     "

## 2025-04-17 NOTE — TELEPHONE ENCOUNTER
Call patient and let her know Pharmacy contacted me this morning and said that the patient's appeal for brand-name Lexapro was denied.  There is no other additional things we can do with her insurance company they recommended that she try generic again or she can pay out-of-pocket via a discount card or GoodRx for the brand-name Lexapro.  At this point we will not do any more appeals, as they were already denied.    rKistyn Harry DO, MSEd  [facility]  Office: [phone]  4/17/2025 9:26 AM

## 2025-04-17 NOTE — TELEPHONE ENCOUNTER
Called and spoke with pt and she was informed and verbalized understanding. Pt states that she is willing to try the generic for the Lexapro, the only thing she is worried about is how she weans herself off of the brand name to the generic and hoping that this will work for her. She states that she only has about a week and a half left of the brand name, so if this refill could be sent to the [facility] on file.

## 2025-05-12 DIAGNOSIS — I10 PRIMARY HYPERTENSION: ICD-10-CM

## 2025-05-15 RX ORDER — HYDROCHLOROTHIAZIDE 12.5 MG/1
12.5 TABLET ORAL DAILY
Qty: 90 TABLET | Refills: 3 | Status: SHIPPED | OUTPATIENT
Start: 2025-05-15

## 2025-05-15 NOTE — TELEPHONE ENCOUNTER
BRIEF NOTE    Subjective/Objective:  Pharmacy refill request.     Assessment/Plan:  1. Primary hypertension  - hydroCHLOROthiazide (Microzide) 12.5 mg tablet; TAKE 1 TABLET BY MOUTH DAILY  Dispense: 90 tablet; Refill: 3      No problem-specific Assessment & Plan notes found for this encounter.        Kristyn Harry DO, MSEd  [facility]  Office: [phone]  5/15/2025 9:45 AM

## 2025-12-16 ENCOUNTER — APPOINTMENT (OUTPATIENT)
Dept: PRIMARY CARE | Facility: CLINIC | Age: 60
End: 2025-12-16
Payer: COMMERCIAL

## (undated) DEVICE — Device

## (undated) DEVICE — APPLICATOR, CHLORAPREP, W/ORANGE TINT, 26ML